# Patient Record
Sex: FEMALE | Race: WHITE | NOT HISPANIC OR LATINO | URBAN - METROPOLITAN AREA
[De-identification: names, ages, dates, MRNs, and addresses within clinical notes are randomized per-mention and may not be internally consistent; named-entity substitution may affect disease eponyms.]

---

## 2019-10-29 LAB — NONINV COLON CA DNA+OCC BLD SCRN STL QL: NEGATIVE

## 2020-02-28 LAB
BUN SERPL-MCNC: 12 MG/DL (ref 7–18)
CHOLEST SERPL-MCNC: 192 MG/DL
CREAT SERPL-MCNC: 0.84 MG/DL (ref 0.6–1.3)
GLUCOSE SERPL-MCNC: 99 MG/DL (ref 74–106)
HDLC SERPL-MCNC: 65 MG/DL (ref 35–60)
LDLC SERPL CALC-MCNC: 111 MG/DL
TRIGL SERPL-MCNC: 79 MG/DL (ref 30–150)

## 2022-04-10 ENCOUNTER — HISTORICAL (OUTPATIENT)
Dept: ADMINISTRATIVE | Facility: HOSPITAL | Age: 72
End: 2022-04-10

## 2022-04-26 VITALS
WEIGHT: 159.38 LBS | BODY MASS INDEX: 30.09 KG/M2 | OXYGEN SATURATION: 98 % | DIASTOLIC BLOOD PRESSURE: 80 MMHG | HEIGHT: 61 IN | SYSTOLIC BLOOD PRESSURE: 119 MMHG

## 2022-08-02 ENCOUNTER — OFFICE VISIT (OUTPATIENT)
Dept: PRIMARY CARE CLINIC | Facility: CLINIC | Age: 72
End: 2022-08-02
Payer: MEDICARE

## 2022-08-02 VITALS
DIASTOLIC BLOOD PRESSURE: 80 MMHG | OXYGEN SATURATION: 98 % | BODY MASS INDEX: 30.03 KG/M2 | SYSTOLIC BLOOD PRESSURE: 136 MMHG | WEIGHT: 157 LBS | HEART RATE: 81 BPM

## 2022-08-02 DIAGNOSIS — D64.9 ANEMIA, UNSPECIFIED TYPE: ICD-10-CM

## 2022-08-02 DIAGNOSIS — Z76.89 ENCOUNTER TO ESTABLISH CARE: Primary | ICD-10-CM

## 2022-08-02 DIAGNOSIS — E78.5 HYPERLIPIDEMIA, UNSPECIFIED HYPERLIPIDEMIA TYPE: ICD-10-CM

## 2022-08-02 DIAGNOSIS — Z00.00 WELLNESS EXAMINATION: ICD-10-CM

## 2022-08-02 DIAGNOSIS — M51.36 DDD (DEGENERATIVE DISC DISEASE), LUMBAR: ICD-10-CM

## 2022-08-02 PROBLEM — M51.369 DDD (DEGENERATIVE DISC DISEASE), LUMBAR: Status: ACTIVE | Noted: 2022-08-02

## 2022-08-02 PROCEDURE — 99214 PR OFFICE/OUTPT VISIT, EST, LEVL IV, 30-39 MIN: ICD-10-PCS | Mod: ,,, | Performed by: FAMILY MEDICINE

## 2022-08-02 PROCEDURE — 99214 OFFICE O/P EST MOD 30 MIN: CPT | Mod: ,,, | Performed by: FAMILY MEDICINE

## 2022-08-02 RX ORDER — ALCLOMETASONE DIPROPIONATE 0.5 MG/G
CREAM TOPICAL 2 TIMES DAILY
COMMUNITY
Start: 2022-07-31

## 2022-08-02 RX ORDER — ALBUTEROL SULFATE 90 UG/1
1 AEROSOL, METERED RESPIRATORY (INHALATION) EVERY 6 HOURS PRN
Qty: 6.7 G | Refills: 5 | Status: SHIPPED | OUTPATIENT
Start: 2022-08-02 | End: 2023-11-27 | Stop reason: SDUPTHER

## 2022-08-02 RX ORDER — BUDESONIDE 0.5 MG/2ML
INHALANT ORAL
COMMUNITY
Start: 2022-08-01 | End: 2023-11-27 | Stop reason: SDUPTHER

## 2022-08-02 RX ORDER — BUDESONIDE AND FORMOTEROL FUMARATE DIHYDRATE 80; 4.5 UG/1; UG/1
AEROSOL RESPIRATORY (INHALATION)
COMMUNITY
End: 2023-11-27 | Stop reason: SDUPTHER

## 2022-08-02 RX ORDER — ALBUTEROL SULFATE 90 UG/1
AEROSOL, METERED RESPIRATORY (INHALATION)
COMMUNITY
End: 2022-08-02 | Stop reason: SDUPTHER

## 2022-08-02 RX ORDER — MONTELUKAST SODIUM 10 MG/1
10 TABLET ORAL DAILY
COMMUNITY
Start: 2022-04-28 | End: 2022-08-02 | Stop reason: SDUPTHER

## 2022-08-02 RX ORDER — TRAMADOL HYDROCHLORIDE 50 MG/1
50 TABLET ORAL EVERY 12 HOURS PRN
Qty: 60 TABLET | Refills: 5 | Status: SHIPPED | OUTPATIENT
Start: 2022-08-02 | End: 2023-04-18

## 2022-08-02 RX ORDER — TRAMADOL HYDROCHLORIDE 50 MG/1
TABLET ORAL
COMMUNITY
End: 2022-08-02 | Stop reason: SDUPTHER

## 2022-08-02 RX ORDER — EPINEPHRINE 0.22MG
AEROSOL WITH ADAPTER (ML) INHALATION
COMMUNITY
Start: 2011-12-02

## 2022-08-02 RX ORDER — BETAMETHASONE VALERATE 1.2 MG/G
CREAM TOPICAL 2 TIMES DAILY
COMMUNITY
Start: 2022-05-03 | End: 2023-06-06

## 2022-08-02 RX ORDER — ALPRAZOLAM 1 MG/1
TABLET ORAL
COMMUNITY
End: 2023-08-24 | Stop reason: SDUPTHER

## 2022-08-02 RX ORDER — LEVOCETIRIZINE DIHYDROCHLORIDE 5 MG/1
TABLET, FILM COATED ORAL
COMMUNITY

## 2022-08-02 RX ORDER — BETAMETHASONE DIPROPIONATE 0.5 MG/G
LOTION TOPICAL
COMMUNITY
Start: 2022-07-15 | End: 2023-02-14

## 2022-08-02 RX ORDER — DOXYCYCLINE 50 MG/1
100 CAPSULE ORAL NIGHTLY
COMMUNITY
Start: 2022-07-15

## 2022-08-02 RX ORDER — MONTELUKAST SODIUM 10 MG/1
10 TABLET ORAL DAILY
Qty: 90 TABLET | Refills: 3 | Status: SHIPPED | OUTPATIENT
Start: 2022-08-02 | End: 2023-11-27 | Stop reason: SDUPTHER

## 2022-08-02 RX ORDER — VALSARTAN AND HYDROCHLOROTHIAZIDE 320; 25 MG/1; MG/1
TABLET, FILM COATED ORAL
COMMUNITY
End: 2022-12-05

## 2022-08-03 NOTE — ASSESSMENT & PLAN NOTE
Tramadol as needed for breakthrough pain with use of over-the-counter Tylenol and NSAID medication as needed for adequate pain control with referral in the future to  for pain management if necessary.  Difficulty walking less than 50 ft without having to stop secondary to pain, temporary handicap parking license recommended

## 2022-08-03 NOTE — PROGRESS NOTES
Chief Complaint  Chief Complaint   Patient presents with    Needs asthma medication       History of Present Illness  Patient presents to clinic for routine follow-up.  She is essentially establishing care in clinic again today because her last visit was nearly 2 years ago.  She states that she has continued to struggle with lumbar DDD with osteoarthritis currently controlled with as needed tramadol 1-2 times per day for breakthrough pain with inadequate control with over-the-counter NSAIDs and Tylenol.  Hypertension well controlled with current medication regimen as noted.  She states that she is having difficulty walking more than 50 ft without having pain in her back those on she does not ambulate extensively she feels like she can not control it.    Review of Systems  Review of Systems   Constitutional: Negative for chills and fever.   HENT: Negative for congestion and sore throat.    Eyes: Negative for redness and visual disturbance.   Respiratory: Negative for cough and shortness of breath.    Cardiovascular: Negative for chest pain and palpitations.   Gastrointestinal: Negative for nausea and vomiting.   Musculoskeletal: Positive for back pain. Negative for arthralgias and myalgias.   Skin: Negative for pallor and rash.   Neurological: Negative for dizziness and headaches.   Psychiatric/Behavioral: Negative for agitation and dysphoric mood.       Physical Exam  Physical Exam  Constitutional:       Appearance: Normal appearance.   HENT:      Head: Normocephalic and atraumatic.   Eyes:      General: No scleral icterus.     Conjunctiva/sclera: Conjunctivae normal.   Cardiovascular:      Rate and Rhythm: Normal rate and regular rhythm.   Pulmonary:      Effort: Pulmonary effort is normal.      Breath sounds: Normal breath sounds.   Skin:     General: Skin is warm and dry.   Neurological:      General: No focal deficit present.      Mental Status: She is alert and oriented to person, place, and time.    Psychiatric:         Mood and Affect: Mood normal.         Behavior: Behavior normal.         Problem List/Past Medical History  Past Medical History:   Diagnosis Date    HTN (hypertension)     Insomnia     Mild intermittent asthma, uncomplicated        Procedure/Surgical History  Past Surgical History:   Procedure Laterality Date    APPENDECTOMY      BUNIONECTOMY      CHOLECYSTECTOMY      SINUS SURGERY      TONSILLECTOMY         Medications  Current Outpatient Medications on File Prior to Visit   Medication Sig Dispense Refill    alclometasone (ACLOVATE) 0.05 % cream Apply topically 2 (two) times daily.      ALPRAZolam (XANAX) 1 MG tablet alprazolam 1 mg tablet   1/2 tab PRN insomnia      betamethasone dipropionate (DIPROLENE) 0.05 % lotion Apply topically as needed.      betamethasone valerate 0.1% (VALISONE) 0.1 % Crea Apply topically 2 (two) times daily.      budesonide (PULMICORT) 0.5 mg/2 mL nebulizer solution       budesonide-formoterol 80-4.5 mcg (SYMBICORT) 80-4.5 mcg/actuation HFAA budesonide-formoterol HFA 80 mcg-4.5 mcg/actuation aerosol inhaler   INHALE 2 PUFFS BY MOUTH TWICE A DAY      coenzyme Q10 100 mg capsule       doxycycline (MONODOX) 50 MG Cap Take 100 mg by mouth nightly.      levocetirizine (XYZAL) 5 MG tablet Xyzal 5 mg tablet   Take 1 tablet every day by oral route as needed.      valsartan-hydrochlorothiazide (DIOVAN-HCT) 320-25 mg per tablet valsartan 320 mg-hydrochlorothiazide 25 mg tablet   TAKE 1 TABLET BY MOUTH EVERY DAY       No current facility-administered medications on file prior to visit.       Allegies  Review of patient's allergies indicates:  No Known Allergies     Social History  Social History     Socioeconomic History    Marital status:    Tobacco Use    Smoking status: Never Smoker    Smokeless tobacco: Never Used       Family History  History reviewed. No pertinent family history.      Immunizations    There is no immunization history on file  for this patient.    Health Maintenance  Health Maintenance   Topic Date Due    Hepatitis C Screening  Never done    Lipid Panel  Never done    TETANUS VACCINE  Never done    Mammogram  Never done    DEXA Scan  Never done        Assessment / Plan  Problem List Items Addressed This Visit        Neuro    DDD (degenerative disc disease), lumbar    Current Assessment & Plan     Tramadol as needed for breakthrough pain with use of over-the-counter Tylenol and NSAID medication as needed for adequate pain control with referral in the future to  for pain management if necessary.  Difficulty walking less than 50 ft without having to stop secondary to pain, temporary handicap parking license recommended              Other    Encounter to establish care - Primary    Current Assessment & Plan     Discussed routine annual health maintenance and screening in addition to acute issues noted below.             Other Visit Diagnoses     Anemia, unspecified type        Wellness examination        Hyperlipidemia, unspecified hyperlipidemia type              RTC 6 months for wellness    Santos Templeton

## 2022-09-16 ENCOUNTER — HISTORICAL (OUTPATIENT)
Dept: ADMINISTRATIVE | Facility: HOSPITAL | Age: 72
End: 2022-09-16
Payer: COMMERCIAL

## 2023-02-09 ENCOUNTER — OFFICE VISIT (OUTPATIENT)
Dept: PRIMARY CARE CLINIC | Facility: CLINIC | Age: 73
End: 2023-02-09
Payer: MEDICARE

## 2023-02-09 VITALS
OXYGEN SATURATION: 96 % | BODY MASS INDEX: 29.45 KG/M2 | DIASTOLIC BLOOD PRESSURE: 74 MMHG | WEIGHT: 154 LBS | HEART RATE: 85 BPM | SYSTOLIC BLOOD PRESSURE: 129 MMHG

## 2023-02-09 DIAGNOSIS — G89.29 CHRONIC BACK PAIN, UNSPECIFIED BACK LOCATION, UNSPECIFIED BACK PAIN LATERALITY: Primary | ICD-10-CM

## 2023-02-09 DIAGNOSIS — M51.36 DDD (DEGENERATIVE DISC DISEASE), LUMBAR: ICD-10-CM

## 2023-02-09 DIAGNOSIS — M54.12 CERVICAL RADICULITIS: ICD-10-CM

## 2023-02-09 DIAGNOSIS — M54.9 CHRONIC BACK PAIN, UNSPECIFIED BACK LOCATION, UNSPECIFIED BACK PAIN LATERALITY: Primary | ICD-10-CM

## 2023-02-09 PROCEDURE — 99214 PR OFFICE/OUTPT VISIT, EST, LEVL IV, 30-39 MIN: ICD-10-PCS | Mod: ,,, | Performed by: FAMILY MEDICINE

## 2023-02-09 PROCEDURE — 99214 OFFICE O/P EST MOD 30 MIN: CPT | Mod: ,,, | Performed by: FAMILY MEDICINE

## 2023-02-09 RX ORDER — DOXYCYCLINE HYCLATE 50 MG/1
CAPSULE ORAL
COMMUNITY
Start: 2022-07-01

## 2023-02-09 NOTE — PROGRESS NOTES
Chief Complaint  Chief Complaint   Patient presents with    Back Pain     She will discuss it with you . Mike nerve, hurts when she walks and gets up from sitting position       History of Present Illness  Patient presents to clinic for routine follow-up.  Continues to struggle with back pain and is requesting referral to pain management specialist but not 1 that will work with medications, more with injections and also physical therapy in late chart was which is closer to where she lives.  She reports concern about possible vagus nerve issues which she looked up on the Internet after experiencing symptoms of at nighttime when turning while lying in bed having sensation radiating pain to the bilateral arms sometimes with a stress reaction but denies any syncope or lightheadedness or dizziness with activity.  A Work was performed earlier this morning but we have not yet received the results for review.    Review of Systems  Review of Systems   Constitutional:  Negative for chills and fever.   HENT:  Negative for congestion and sore throat.    Eyes:  Negative for redness and visual disturbance.   Respiratory:  Negative for cough and shortness of breath.    Cardiovascular:  Negative for chest pain and palpitations.   Gastrointestinal:  Negative for nausea and vomiting.   Musculoskeletal:  Positive for back pain. Negative for arthralgias and myalgias.   Skin:  Negative for pallor and rash.   Neurological:  Negative for dizziness and headaches.   Psychiatric/Behavioral:  Negative for agitation and dysphoric mood.      Physical Exam  Physical Exam  Constitutional:       Appearance: Normal appearance.   HENT:      Head: Normocephalic and atraumatic.   Eyes:      General: No scleral icterus.     Conjunctiva/sclera: Conjunctivae normal.   Cardiovascular:      Rate and Rhythm: Normal rate and regular rhythm.   Pulmonary:      Effort: Pulmonary effort is normal.      Breath sounds: Normal breath sounds.   Skin:      General: Skin is warm and dry.   Neurological:      General: No focal deficit present.      Mental Status: She is alert and oriented to person, place, and time.   Psychiatric:         Mood and Affect: Mood normal.         Behavior: Behavior normal.       Problem List/Past Medical History  Past Medical History:   Diagnosis Date    HTN (hypertension)     Insomnia     Mild intermittent asthma, uncomplicated        Procedure/Surgical History  Past Surgical History:   Procedure Laterality Date    APPENDECTOMY      BUNIONECTOMY      CHOLECYSTECTOMY      SINUS SURGERY      TONSILLECTOMY         Medications  Current Outpatient Medications on File Prior to Visit   Medication Sig Dispense Refill    albuterol (PROVENTIL/VENTOLIN HFA) 90 mcg/actuation inhaler Inhale 1 puff into the lungs every 6 (six) hours as needed for Wheezing. Rescue 6.7 g 5    alclometasone (ACLOVATE) 0.05 % cream Apply topically 2 (two) times daily.      ALPRAZolam (XANAX) 1 MG tablet alprazolam 1 mg tablet   1/2 tab PRN insomnia      betamethasone dipropionate (DIPROLENE) 0.05 % lotion Apply topically as needed.      betamethasone valerate 0.1% (VALISONE) 0.1 % Crea Apply topically 2 (two) times daily.      budesonide (PULMICORT) 0.5 mg/2 mL nebulizer solution       budesonide-formoterol 80-4.5 mcg (SYMBICORT) 80-4.5 mcg/actuation HFAA budesonide-formoterol HFA 80 mcg-4.5 mcg/actuation aerosol inhaler   INHALE 2 PUFFS BY MOUTH TWICE A DAY      coenzyme Q10 100 mg capsule       doxycycline (MONODOX) 50 MG Cap Take 100 mg by mouth nightly.      doxycycline 50 MG capsule       levocetirizine (XYZAL) 5 MG tablet Xyzal 5 mg tablet   Take 1 tablet every day by oral route as needed.      montelukast (SINGULAIR) 10 mg tablet Take 1 tablet (10 mg total) by mouth once daily. 90 tablet 3    traMADoL (ULTRAM) 50 mg tablet Take 1 tablet (50 mg total) by mouth every 12 (twelve) hours as needed for Pain. 60 tablet 5    valsartan-hydrochlorothiazide (DIOVAN-HCT) 320-25  mg per tablet TAKE 1 TABLET BY MOUTH EVERY DAY 90 tablet 3     No current facility-administered medications on file prior to visit.       Clem  Review of patient's allergies indicates:  No Known Allergies     Social History  Social History     Socioeconomic History    Marital status:    Tobacco Use    Smoking status: Never    Smokeless tobacco: Never   Substance and Sexual Activity    Alcohol use: Yes     Comment: 4 times a week 2 drinks    Drug use: Never    Sexual activity: Yes       Family History  History reviewed. No pertinent family history.      Immunizations    There is no immunization history on file for this patient.    Health Maintenance  Health Maintenance   Topic Date Due    Hepatitis C Screening  Never done    TETANUS VACCINE  Never done    Mammogram  Never done    DEXA Scan  Never done    Lipid Panel  02/28/2025        Assessment / Plan  Problem List Items Addressed This Visit          Neuro    DDD (degenerative disc disease), lumbar    Overview     Pain management and physical therapy         Cervical radiculitis    Overview     Discussed with patient that symptoms seem 1 lima cervical radiculopathy versus vagus reaction with possible stress reaction as well.  To rule out they go issues would recommend monitoring of vital signs with home blood pressure cuff when symptoms occur and report back in 1 week for virtual visit to discuss possible further treatment options.  ER precautions given for any signs of dyspnea on exertion or exertional chest pain.          Other Visit Diagnoses       Chronic back pain, unspecified back location, unspecified back pain laterality    -  Primary          RTC 1 week for telemed visit for lab review    Santos Templeton

## 2023-02-13 ENCOUNTER — OFFICE VISIT (OUTPATIENT)
Dept: PRIMARY CARE CLINIC | Facility: CLINIC | Age: 73
End: 2023-02-13
Payer: MEDICARE

## 2023-02-13 DIAGNOSIS — M51.36 DDD (DEGENERATIVE DISC DISEASE), LUMBAR: Primary | ICD-10-CM

## 2023-02-13 PROCEDURE — 99443 PR PHYSICIAN TELEPHONE EVALUATION 21-30 MIN: ICD-10-PCS | Mod: 95,,, | Performed by: FAMILY MEDICINE

## 2023-02-13 PROCEDURE — 99443 PR PHYSICIAN TELEPHONE EVALUATION 21-30 MIN: CPT | Mod: 95,,, | Performed by: FAMILY MEDICINE

## 2023-02-13 NOTE — PROGRESS NOTES
Established Patient - Audio Only Telehealth Visit     The patient location is: home  The chief complaint leading to consultation is: lab review  Visit type: Virtual visit with audio only (telephone)  Total time spent with patient: 23 mins       The reason for the audio only service rather than synchronous audio and video virtual visit was related to technical difficulties or patient preference/necessity.     Each patient to whom I provide medical services by telemedicine is:  (1) informed of the relationship between the physician and patient and the respective role of any other health care provider with respect to management of the patient; and (2) notified that they may decline to receive medical services by telemedicine and may withdraw from such care at any time. Patient verbally consented to receive this service via voice-only telephone call.       HPI:  Patient presents for follow-up after experiencing symptoms at time with radiating sensation to the bilateral arms when turning in bed.  She was concerned after research about the possibility of a vagal reaction but in clinic 4 days ago we discussed the possibility of a stress reaction and it attempted biofeedback techniques which seemed to improve the problem over the past several days.  She was also referred to pain management specialist for injections and physical therapy but has not yet heard from these referrals.  She denies any other acute complaints or concerns     Assessment and plan:  Continue biofeedback techniques and follow-up with referral to pain management and physical therapy                        This service was not originating from a related E/M service provided within the previous 7 days nor will  to an E/M service or procedure within the next 24 hours or my soonest available appointment.  Prevailing standard of care was able to be met in this audio-only visit.

## 2023-02-22 NOTE — PROGRESS NOTES
Subjective:      Patient ID: Mark Davila is a 72 y.o. female.    Chief Complaint: Back Pain (Referred by Dr. Benito Templeton for back pain 10 plus years, pt states no prior surgery or injury, has had injections in the past, had nerve burning a few years ago, would like to discuss options today pain level 7/10)    Referred by: Santos Templeton MD     HPI: Patient presents as a new consult for low back pain for 10+ years. Relates pain due to arthritis of lumbar spine.  Pain located to bilateral low back without radation to legs. Pain abated with prolonged standing., mopping, sweeping. Sitting to standing also increases pain. Pain  wakes her up at night. States her right hip will hurt on occasion. Patient has seen a prior Pain Management MD in [place] 3 years ago. She recalls a burning of her nerve to low back. This helped her back pain for >1.5 years. Currently back pain has returned. She can not recall the last MRI lumbar spine. Patient has never completed PT.   Describes pain as burning and aching.    Pain meds include Tramadol 50 mg BID, Ibuprofen 600 mg two days a week.     Interventional Pain History  Prior injections to lumbar spine from pain specialist in [place]  (possible diagnostic medial branch blocks and radiofrequency ablation)    ROS: bilateral low back pain      MRI lumbar spine:  No imaging over past 3+ years completed    Pertinent labs 2022:   Creatinine normal 0.84   No current GFR, comprehensive metabolic panel, or CBC with differential on file in EMR, care everywhere or             Objective:          Physical Exam  General: Well developed;LLoquacious, overweight; A&O x 3; No anxiety/depression; NAD  Mental Status: Oriented to person, palce and time. Displays appropriate mood & affect.  Head: Norm cephalic and atraumatic  Neck: full ROm with lateral neck turning, extension and flexion.  Eyes: normal conjunctiva, normal lids, normal pupils  ENT and mouth: normal external ear,  nose, and no lesions noted on the lips.  Respiratory: Symmetrical, Unlabored. No dyspnea  CV: normal  S1/S2, normal rhythm and rate. No peripheral edema.   Abdomen: Non-distended    Extremities:  Gen: No cyanosis or tenderness to palpation bilateral upper and lower extremities  Skin: Warm, pink, dry, no rashes, no lesions on the lumbar spine  Strength: 5/5 motor strength bilateral upper and lower  ROM: Full ROM in bilateral knees and ankles without pain or instability.    Neuro:  Gait: no altalgic lean, normal toe and heel raise. Independent ambulation   DTR's: 2+ in bilateral patellar, and ankle  Sensory: Intact to light touch bilateral  upper and lower extremities    Spine: Normal lordosis. No scoliosis  L-spine ROM: Full ROM to flexion,  pain and limited ROM extension with bilateral rotation   Straight Leg Raise: negative bilaterally  SI Joint: No tenderness to palpation bilaterally.             Assessment:       Encounter Diagnoses   Name Primary?    Chronic back pain greater than 3 months duration Yes    Chronic back pain, unspecified back location, unspecified back pain laterality          Plan:       Mark was seen today for back pain.    Diagnoses and all orders for this visit:    Chronic back pain greater than 3 months duration    Chronic back pain, unspecified back location, unspecified back pain laterality  -     Ambulatory referral/consult to Pain Clinic     Patient to call back  or send message in portal with PT group in [place], [place] to eval and treat lumbar spondylosis.     Pt will eventually need a MRI lumbar spine to investigate sources of her low back pain.  Her pain is likely due to lumbar facet arthropathy.    Patient does not want sedation (propofol) for MBB if indicated and would like for me to ask Dr. Colin if she can forego this in the future. She would also like to receive RFA's to bilateral low back at the same time without sedation She states she had this done in the past and it  was manageable.  Request sent to Dr. Colin to confirm if she would perform these procedures without sedation, provided these procedures are indicated after patient was able to complete a lumbar MRI.  I will follow-up with patient to relay Dr. Colin's response.    Request current labs CBC w/diff, CMP from Dr. Templeton PCP    After PT completed 6 weeks, patient will need a follow up appointment with me in clinic.           Past Medical History:   Diagnosis Date    HTN (hypertension)     Insomnia     Mild intermittent asthma, uncomplicated        Past Surgical History:   Procedure Laterality Date    APPENDECTOMY      BUNIONECTOMY      CHOLECYSTECTOMY      SINUS SURGERY      TONSILLECTOMY         History reviewed. No pertinent family history.    Social History     Socioeconomic History    Marital status:    Tobacco Use    Smoking status: Never    Smokeless tobacco: Never   Substance and Sexual Activity    Alcohol use: Yes     Comment: 4 times a week 2 drinks    Drug use: Never    Sexual activity: Yes       Current Outpatient Medications   Medication Sig Dispense Refill    albuterol (PROVENTIL/VENTOLIN HFA) 90 mcg/actuation inhaler Inhale 1 puff into the lungs every 6 (six) hours as needed for Wheezing. Rescue 6.7 g 5    alclometasone (ACLOVATE) 0.05 % cream Apply topically 2 (two) times daily.      ALPRAZolam (XANAX) 1 MG tablet alprazolam 1 mg tablet   1/2 tab PRN insomnia      betamethasone valerate 0.1% (VALISONE) 0.1 % Crea Apply topically 2 (two) times daily.      budesonide (PULMICORT) 0.5 mg/2 mL nebulizer solution       budesonide-formoterol 80-4.5 mcg (SYMBICORT) 80-4.5 mcg/actuation HFAA budesonide-formoterol HFA 80 mcg-4.5 mcg/actuation aerosol inhaler   INHALE 2 PUFFS BY MOUTH TWICE A DAY      coenzyme Q10 100 mg capsule       doxycycline (MONODOX) 50 MG Cap Take 100 mg by mouth nightly.      doxycycline 50 MG capsule       levocetirizine (XYZAL) 5 MG tablet Xyzal 5 mg tablet   Take 1 tablet  every day by oral route as needed.      montelukast (SINGULAIR) 10 mg tablet Take 1 tablet (10 mg total) by mouth once daily. 90 tablet 3    traMADoL (ULTRAM) 50 mg tablet Take 1 tablet (50 mg total) by mouth every 12 (twelve) hours as needed for Pain. 60 tablet 5    valsartan-hydrochlorothiazide (DIOVAN-HCT) 320-25 mg per tablet TAKE 1 TABLET BY MOUTH EVERY DAY 90 tablet 3     No current facility-administered medications for this visit.       Review of patient's allergies indicates:  No Known Allergies

## 2023-02-23 ENCOUNTER — OFFICE VISIT (OUTPATIENT)
Dept: PAIN MEDICINE | Facility: CLINIC | Age: 73
End: 2023-02-23
Payer: MEDICARE

## 2023-02-23 VITALS
HEART RATE: 65 BPM | DIASTOLIC BLOOD PRESSURE: 76 MMHG | SYSTOLIC BLOOD PRESSURE: 126 MMHG | HEIGHT: 61 IN | WEIGHT: 154 LBS | BODY MASS INDEX: 29.07 KG/M2 | TEMPERATURE: 99 F

## 2023-02-23 DIAGNOSIS — M54.9 CHRONIC BACK PAIN, UNSPECIFIED BACK LOCATION, UNSPECIFIED BACK PAIN LATERALITY: ICD-10-CM

## 2023-02-23 DIAGNOSIS — M47.816 LUMBAR SPONDYLOSIS: Primary | ICD-10-CM

## 2023-02-23 DIAGNOSIS — M54.9 CHRONIC BACK PAIN GREATER THAN 3 MONTHS DURATION: ICD-10-CM

## 2023-02-23 DIAGNOSIS — G89.29 CHRONIC BACK PAIN, UNSPECIFIED BACK LOCATION, UNSPECIFIED BACK PAIN LATERALITY: ICD-10-CM

## 2023-02-23 DIAGNOSIS — G89.29 CHRONIC BACK PAIN GREATER THAN 3 MONTHS DURATION: ICD-10-CM

## 2023-02-23 PROCEDURE — 99204 PR OFFICE/OUTPT VISIT, NEW, LEVL IV, 45-59 MIN: ICD-10-PCS | Mod: ,,, | Performed by: NURSE PRACTITIONER

## 2023-02-23 PROCEDURE — 99204 OFFICE O/P NEW MOD 45 MIN: CPT | Mod: ,,, | Performed by: NURSE PRACTITIONER

## 2023-02-23 NOTE — PATIENT INSTRUCTIONS
Call us or send message through portal to let me know where to place PT order in [place].     I will ask Dr Colin if she will  if she will forego anesthesia/sedation if she is a candidate for lumbar Medial Branch Blocks and subsequent Radiofrequency Ablation in the future.      I am also asking Dr Templeton to send us your current complete metabolic panel and complete blood cell count as well.

## 2023-04-03 ENCOUNTER — PATIENT MESSAGE (OUTPATIENT)
Dept: ADMINISTRATIVE | Facility: HOSPITAL | Age: 73
End: 2023-04-03
Payer: COMMERCIAL

## 2023-05-01 ENCOUNTER — PATIENT MESSAGE (OUTPATIENT)
Dept: ADMINISTRATIVE | Facility: HOSPITAL | Age: 73
End: 2023-05-01
Payer: COMMERCIAL

## 2023-05-30 ENCOUNTER — PATIENT MESSAGE (OUTPATIENT)
Dept: ADMINISTRATIVE | Facility: HOSPITAL | Age: 73
End: 2023-05-30
Payer: COMMERCIAL

## 2023-06-06 ENCOUNTER — OFFICE VISIT (OUTPATIENT)
Dept: PRIMARY CARE CLINIC | Facility: CLINIC | Age: 73
End: 2023-06-06
Payer: MEDICARE

## 2023-06-06 VITALS
BODY MASS INDEX: 29.84 KG/M2 | SYSTOLIC BLOOD PRESSURE: 144 MMHG | DIASTOLIC BLOOD PRESSURE: 88 MMHG | TEMPERATURE: 99 F | HEART RATE: 64 BPM | WEIGHT: 156 LBS | OXYGEN SATURATION: 95 %

## 2023-06-06 DIAGNOSIS — M25.561 ACUTE PAIN OF RIGHT KNEE: Primary | ICD-10-CM

## 2023-06-06 DIAGNOSIS — K12.30 MUCOSITIS: Primary | ICD-10-CM

## 2023-06-06 PROBLEM — J45.20 MILD INTERMITTENT ASTHMA, UNCOMPLICATED: Status: ACTIVE | Noted: 2023-06-06

## 2023-06-06 PROBLEM — Z76.89 ENCOUNTER TO ESTABLISH CARE: Status: RESOLVED | Noted: 2022-08-02 | Resolved: 2023-06-06

## 2023-06-06 PROBLEM — R22.41 LOCALIZED SWELLING OF RIGHT LOWER EXTREMITY: Status: ACTIVE | Noted: 2023-06-06

## 2023-06-06 PROCEDURE — 99214 PR OFFICE/OUTPT VISIT, EST, LEVL IV, 30-39 MIN: ICD-10-PCS | Mod: ,,, | Performed by: STUDENT IN AN ORGANIZED HEALTH CARE EDUCATION/TRAINING PROGRAM

## 2023-06-06 PROCEDURE — 99214 OFFICE O/P EST MOD 30 MIN: CPT | Mod: ,,, | Performed by: STUDENT IN AN ORGANIZED HEALTH CARE EDUCATION/TRAINING PROGRAM

## 2023-06-06 RX ORDER — PREDNISOLONE 15 MG/5ML
SOLUTION ORAL
COMMUNITY
End: 2023-06-06 | Stop reason: SDUPTHER

## 2023-06-06 RX ORDER — PREDNISOLONE 15 MG/5ML
SOLUTION ORAL
Qty: 480 ML | Refills: 1 | Status: SHIPPED | OUTPATIENT
Start: 2023-06-06 | End: 2023-08-24 | Stop reason: SDUPTHER

## 2023-06-06 NOTE — PROGRESS NOTES
Date: 06/06/2023  Patient ID: [id number]   Chief Complaint: Leg Swelling (Swelling from the ankle to the thigh on the right leg.)    HPI:   Mark Davila is a 72 y.o. female here today for Leg Swelling (Swelling from the ankle to the thigh on the right leg.)  4 mos ago patient was grabbing something from cabinet and felt sharp-stabbing pain on lateral aspect of anterior right knee. It resolved but returned 1 month ago with diffuse pain and swelling on right knee, with crepitus, improved with raising legs and in morning and mildly with Ibuprofen and Tramadol (used for back pain), worsens with pressure/moving. Wears whole leg brace. She denies weakness, locking.   Past Medical History:   Diagnosis Date    HTN (hypertension)     Insomnia     Mild intermittent asthma, uncomplicated     Phlebitis     right leg       Past Surgical History:   Procedure Laterality Date    APPENDECTOMY      BUNIONECTOMY      CHOLECYSTECTOMY      SINUS SURGERY      TONSILLECTOMY         Review of patient's allergies indicates:  No Known Allergies    Outpatient Medications Marked as Taking for the 6/6/23 encounter (Office Visit) with Shy Valentin MD   Medication Sig Dispense Refill    albuterol (PROVENTIL/VENTOLIN HFA) 90 mcg/actuation inhaler Inhale 1 puff into the lungs every 6 (six) hours as needed for Wheezing. Rescue 6.7 g 5    alclometasone (ACLOVATE) 0.05 % cream Apply topically 2 (two) times daily.      ALPRAZolam (XANAX) 1 MG tablet alprazolam 1 mg tablet   1/2 tab PRN insomnia      budesonide (PULMICORT) 0.5 mg/2 mL nebulizer solution       budesonide-formoterol 80-4.5 mcg (SYMBICORT) 80-4.5 mcg/actuation HFAA budesonide-formoterol HFA 80 mcg-4.5 mcg/actuation aerosol inhaler   INHALE 2 PUFFS BY MOUTH TWICE A DAY      coenzyme Q10 100 mg capsule       doxycycline (MONODOX) 50 MG Cap Take 100 mg by mouth nightly.      doxycycline 50 MG capsule       levocetirizine (XYZAL) 5 MG tablet Xyzal 5 mg tablet   Take 1 tablet every day  by oral route as needed.      montelukast (SINGULAIR) 10 mg tablet Take 1 tablet (10 mg total) by mouth once daily. 90 tablet 3    traMADoL (ULTRAM) 50 mg tablet TAKE 1 TABLET BY MOUTH EVERY 12 HOURS AS NEEDED FOR PAIN. 14 tablet 0    valsartan-hydrochlorothiazide (DIOVAN-HCT) 320-25 mg per tablet TAKE 1 TABLET BY MOUTH EVERY DAY 90 tablet 3       History reviewed. No pertinent family history.     Social History     Socioeconomic History    Marital status:    Tobacco Use    Smoking status: Never    Smokeless tobacco: Never   Substance and Sexual Activity    Alcohol use: Yes     Comment: 4 times a week 2 drinks    Drug use: Never    Sexual activity: Yes       Patient Care Team:  Shy Valentin MD as PCP - General (Family Medicine)     Subjective:     Review of Systems   Respiratory:  Negative for shortness of breath.    Cardiovascular:  Negative for chest pain.   See HPI for details  All Other ROS: Negative except as stated in HPI.     Objective:     BP (!) 144/88   Pulse 64   Temp 99 °F (37.2 °C)   Wt 70.8 kg (156 lb)   SpO2 95%   BMI 29.84 kg/m²     Physical Exam  HENT:      Head: Normocephalic.      Right Ear: External ear normal.      Left Ear: External ear normal.   Pulmonary:      Effort: Pulmonary effort is normal.   Musculoskeletal:      Comments: Musculoskeletal: Right KNEE     Inspection: Antalgic gait; Full weight bearing; 2+ swelling; No erythema; No contusion; No atrophy or deconditioning noted; Normal alignment     Palpation: TTP medial aspect; Mild crepitus; Normal temperature     ROM:        Active Flexion/Extension: (0-140) Full and painless     Strength:        Flexion:  5/5        Extension:  5/5     Special Tests:        Ballotable Effusion: Negative        Lachman's: ACL stable        Posterior Drawer: PCL stable        Patellar Grind (patellofemoral syndrome): Negative        Neurovascular: Intact, 2+ distal pulses, Sensation intact to light touch     Lymphatic: No lymphadenopathy            Musculoskeletal: Left KNEE     Inspection: Normal gait; Full weight bearing; No swelling; No erythema; No contusion; No atrophy or deconditioning noted; Normal alignment     Palpation: Non-tender; Crepitus Negative; Normal temperature     ROM:        Active Flexion/Extension: (0-140) Full and painless     Strength:        Flexion:  5/5        Extension:  5/5     Special Tests:         Ballotable Effusion: Negative        Lachman's: ACL stable        Posterior Drawer: PCL stable        Patellar Grind (patellofemoral syndrome): Negative        Neurovascular: Intact, 2+ distal pulses, Sensation intact to light touch     Neurovascular: Intact, 2+ distal pulses, Sensation intact to light touch     Lymphatic: No lymphadenopathy     Neurological:      Mental Status: She is alert.       Assessment:       ICD-10-CM ICD-9-CM   1. Acute pain of right knee  M25.561 719.46        Plan:     1. Acute pain of right knee  Overview:  Patient with initial conservative management at this time. Discussed treatment options with patient:  -Avoid provocative activities  -Diclofenac Gel applied to affected area bid prn  -Rest, NSAIDs (OTC Tylenol/Ibuprofen), Local heat or ice to the area  -Home exercise program  -Supportive brace  -Can consider: Acupuncture, Physical Therapy  -Weight loss  -Activity as tolerated  -XR BL knees  Resolution typically within 3 weeks in 91% of patients, and a 4% recurrence rate after 2 years  If symptoms worsen, consider referral to ortho      Orders:  -     X-Ray Knee Complete 4 Or More Views Bilat; Future; Expected date: 06/06/2023         Medication List with Changes/Refills   Current Medications    ALBUTEROL (PROVENTIL/VENTOLIN HFA) 90 MCG/ACTUATION INHALER    Inhale 1 puff into the lungs every 6 (six) hours as needed for Wheezing. Rescue       Start Date: 8/2/2022  End Date: --    ALCLOMETASONE (ACLOVATE) 0.05 % CREAM    Apply topically 2 (two) times daily.       Start Date: 7/31/2022 End Date: --     ALPRAZOLAM (XANAX) 1 MG TABLET    alprazolam 1 mg tablet   1/2 tab PRN insomnia       Start Date: --        End Date: --    BUDESONIDE (PULMICORT) 0.5 MG/2 ML NEBULIZER SOLUTION           Start Date: 8/1/2022  End Date: --    BUDESONIDE-FORMOTEROL 80-4.5 MCG (SYMBICORT) 80-4.5 MCG/ACTUATION HFAA    budesonide-formoterol HFA 80 mcg-4.5 mcg/actuation aerosol inhaler   INHALE 2 PUFFS BY MOUTH TWICE A DAY       Start Date: --        End Date: --    COENZYME Q10 100 MG CAPSULE           Start Date: 12/2/2011 End Date: --    DOXYCYCLINE (MONODOX) 50 MG CAP    Take 100 mg by mouth nightly.       Start Date: 7/15/2022 End Date: --    DOXYCYCLINE 50 MG CAPSULE           Start Date: 7/1/2022  End Date: --    LEVOCETIRIZINE (XYZAL) 5 MG TABLET    Xyzal 5 mg tablet   Take 1 tablet every day by oral route as needed.       Start Date: --        End Date: --    MONTELUKAST (SINGULAIR) 10 MG TABLET    Take 1 tablet (10 mg total) by mouth once daily.       Start Date: 8/2/2022  End Date: --    TRAMADOL (ULTRAM) 50 MG TABLET    TAKE 1 TABLET BY MOUTH EVERY 12 HOURS AS NEEDED FOR PAIN.       Start Date: 4/18/2023 End Date: --    VALSARTAN-HYDROCHLOROTHIAZIDE (DIOVAN-HCT) 320-25 MG PER TABLET    TAKE 1 TABLET BY MOUTH EVERY DAY       Start Date: 12/5/2022 End Date: --   Changed and/or Refilled Medications    Modified Medication Previous Medication    PREDNISOLONE (PRELONE) 15 MG/5 ML SYRUP prednisoLONE (PRELONE) 15 mg/5 mL syrup       TAKE 5 10 ML BY MOUTH 3 TIMES A DAY AS NEEDED FOR MUCOCITIS/DYSPNEA    TAKE 5 10 ML BY MOUTH 3 TIMES A DAY AS NEEDED FOR MUCOCITIS/DYSPNEA       Start Date: 6/6/2023  End Date: --    Start Date: --        End Date: 6/6/2023   Discontinued Medications    BETAMETHASONE VALERATE 0.1% (VALISONE) 0.1 % CREA    Apply topically 2 (two) times daily.       Start Date: 5/3/2022  End Date: 6/6/2023          Follow up in about 4 weeks (around 7/4/2023). In addition to their scheduled follow up, the patient has  also been instructed to follow up on as needed basis.

## 2023-06-07 ENCOUNTER — DOCUMENTATION ONLY (OUTPATIENT)
Dept: PRIMARY CARE CLINIC | Facility: CLINIC | Age: 73
End: 2023-06-07
Payer: COMMERCIAL

## 2023-06-21 ENCOUNTER — TELEPHONE (OUTPATIENT)
Dept: PRIMARY CARE CLINIC | Facility: CLINIC | Age: 73
End: 2023-06-21
Payer: COMMERCIAL

## 2023-06-22 DIAGNOSIS — M17.11 OSTEOARTHRITIS OF RIGHT KNEE, UNSPECIFIED OSTEOARTHRITIS TYPE: Primary | ICD-10-CM

## 2023-06-26 ENCOUNTER — PATIENT OUTREACH (OUTPATIENT)
Dept: ADMINISTRATIVE | Facility: HOSPITAL | Age: 73
End: 2023-06-26
Payer: COMMERCIAL

## 2023-07-25 ENCOUNTER — PATIENT MESSAGE (OUTPATIENT)
Dept: ADMINISTRATIVE | Facility: HOSPITAL | Age: 73
End: 2023-07-25
Payer: COMMERCIAL

## 2023-08-24 ENCOUNTER — OFFICE VISIT (OUTPATIENT)
Dept: PRIMARY CARE CLINIC | Facility: CLINIC | Age: 73
End: 2023-08-24
Payer: MEDICARE

## 2023-08-24 VITALS
OXYGEN SATURATION: 96 % | SYSTOLIC BLOOD PRESSURE: 119 MMHG | WEIGHT: 150.38 LBS | DIASTOLIC BLOOD PRESSURE: 74 MMHG | HEIGHT: 62 IN | BODY MASS INDEX: 27.67 KG/M2 | HEART RATE: 78 BPM | TEMPERATURE: 100 F

## 2023-08-24 DIAGNOSIS — F41.9 ANXIETY: ICD-10-CM

## 2023-08-24 DIAGNOSIS — M19.90 OSTEOARTHRITIS, UNSPECIFIED OSTEOARTHRITIS TYPE, UNSPECIFIED SITE: Primary | ICD-10-CM

## 2023-08-24 DIAGNOSIS — K12.30 MUCOSITIS: ICD-10-CM

## 2023-08-24 DIAGNOSIS — M51.36 DDD (DEGENERATIVE DISC DISEASE), LUMBAR: ICD-10-CM

## 2023-08-24 PROCEDURE — 99214 OFFICE O/P EST MOD 30 MIN: CPT | Mod: ,,, | Performed by: STUDENT IN AN ORGANIZED HEALTH CARE EDUCATION/TRAINING PROGRAM

## 2023-08-24 PROCEDURE — 99214 PR OFFICE/OUTPT VISIT, EST, LEVL IV, 30-39 MIN: ICD-10-PCS | Mod: ,,, | Performed by: STUDENT IN AN ORGANIZED HEALTH CARE EDUCATION/TRAINING PROGRAM

## 2023-08-24 RX ORDER — TRAMADOL HYDROCHLORIDE 50 MG/1
50 TABLET ORAL EVERY 6 HOURS PRN
Qty: 28 TABLET | Refills: 0 | Status: SHIPPED | OUTPATIENT
Start: 2023-08-24 | End: 2023-11-27 | Stop reason: SDUPTHER

## 2023-08-24 RX ORDER — ALPRAZOLAM 1 MG/1
TABLET ORAL
Qty: 30 TABLET | Refills: 3 | Status: SHIPPED | OUTPATIENT
Start: 2023-08-24

## 2023-08-24 RX ORDER — PREDNISOLONE 15 MG/5ML
SOLUTION ORAL
Qty: 480 ML | Refills: 1 | Status: SHIPPED | OUTPATIENT
Start: 2023-08-24

## 2023-08-24 NOTE — PROGRESS NOTES
Date: 08/24/2023  Patient ID: [id number]   Chief Complaint: Medication Refill (Also needs handicap form filled out)    HPI:   Mark Davila is a 73 y.o. female here today for Medication Refill (Also needs handicap form filled out)  Patient recently had f/u with orthopedics who gave her steroid injection and aspiration with significant improvement. Patient moved and ice chest with her R leg, which restarted her knee pain. She also has chronic lumbar DJD. Needs refills on Tramadol and other medications. She needs handicap form.    Patient Active Problem List   Diagnosis    DDD (degenerative disc disease), lumbar    Cervical radiculitis    Localized swelling of right lower extremity    Degenerative joint disease    Mild intermittent asthma, uncomplicated    Anxiety     Outpatient Medications Marked as Taking for the 8/24/23 encounter (Office Visit) with Shy Valentin MD   Medication Sig Dispense Refill    albuterol (PROVENTIL/VENTOLIN HFA) 90 mcg/actuation inhaler Inhale 1 puff into the lungs every 6 (six) hours as needed for Wheezing. Rescue 6.7 g 5    alclometasone (ACLOVATE) 0.05 % cream Apply topically 2 (two) times daily.      budesonide (PULMICORT) 0.5 mg/2 mL nebulizer solution       budesonide-formoterol 80-4.5 mcg (SYMBICORT) 80-4.5 mcg/actuation HFAA budesonide-formoterol HFA 80 mcg-4.5 mcg/actuation aerosol inhaler   INHALE 2 PUFFS BY MOUTH TWICE A DAY      coenzyme Q10 100 mg capsule       doxycycline (MONODOX) 50 MG Cap Take 100 mg by mouth nightly.      levocetirizine (XYZAL) 5 MG tablet Xyzal 5 mg tablet   Take 1 tablet every day by oral route as needed.      montelukast (SINGULAIR) 10 mg tablet Take 1 tablet (10 mg total) by mouth once daily. 90 tablet 3    valsartan-hydrochlorothiazide (DIOVAN-HCT) 320-25 mg per tablet TAKE 1 TABLET BY MOUTH EVERY DAY 90 tablet 3    [DISCONTINUED] ALPRAZolam (XANAX) 1 MG tablet alprazolam 1 mg tablet   1/2 tab PRN insomnia      [DISCONTINUED] prednisoLONE  "(PRELONE) 15 mg/5 mL syrup TAKE 5 10 ML BY MOUTH 3 TIMES A DAY AS NEEDED FOR MUCOCITIS/DYSPNEA 480 mL 1    [DISCONTINUED] traMADoL (ULTRAM) 50 mg tablet TAKE 1 TABLET BY MOUTH EVERY 12 HOURS AS NEEDED FOR PAIN. 14 tablet 0     Past Medical History:   Diagnosis Date    Degenerative joint disease 06/06/2023    HTN (hypertension)     Insomnia     Mild intermittent asthma, uncomplicated     Mild intermittent asthma, uncomplicated     Phlebitis     right leg     Past Surgical History:   Procedure Laterality Date    APPENDECTOMY      BUNIONECTOMY      CHOLECYSTECTOMY      SINUS SURGERY      x 3 with implant    TONSILLECTOMY       Review of patient's allergies indicates:  No Known Allergies  History reviewed. No pertinent family history.   Social History     Socioeconomic History    Marital status:    Tobacco Use    Smoking status: Never    Smokeless tobacco: Never   Substance and Sexual Activity    Alcohol use: Yes     Comment: 4 times a week 2 drinks    Drug use: Never    Sexual activity: Yes     Patient Care Team:  Shy Valentin MD as PCP - General (Family Medicine)  Bernadette Peñaloza LPN as Care Coordinator   Subjective:   ROS  See HPI for details  All Other ROS: Negative except as stated in HPI.   Objective:   /74   Pulse 78   Temp 100 °F (37.8 °C)   Ht 5' 2" (1.575 m)   Wt 68.2 kg (150 lb 6.4 oz)   SpO2 96%   BMI 27.51 kg/m²   Physical Exam  General: NAD  Eye: EOMI  HENT: no nasal discharge  Respiratory: non-labored breathing  Musculoskeletal: ambulates independently but slowly. No obvious deformity  Integumentary: no apparent discoloration  Neurologic: Alert, oriented to person and situation  Cognition and Speech: Speech clear and coherent.   Psychiatric: Cooperative    Assessment:       ICD-10-CM ICD-9-CM   1. Osteoarthritis, unspecified osteoarthritis type, unspecified site  M19.90 715.90   2. Mucositis  K12.30 528.00   3. DDD (degenerative disc disease), lumbar  M51.36 722.52   4. Anxiety  " F41.9 300.00      Plan:   1. Osteoarthritis, unspecified osteoarthritis type, unspecified site  Overview:  Refilled Tramadol for breakthrough pain  Continue HEP and OTC medication  F/u with orthopedics   Consider PT in future      2. Mucositis  -     prednisoLONE (PRELONE) 15 mg/5 mL syrup; TAKE 5 10 ML BY MOUTH 3 TIMES A DAY AS NEEDED FOR MUCOCITIS/DYSPNEA  Dispense: 480 mL; Refill: 1    3. DDD (degenerative disc disease), lumbar  Overview:  As above  Consider PT  Handicap form filled and given to patient    Orders:  -     traMADoL (ULTRAM) 50 mg tablet; Take 1 tablet (50 mg total) by mouth every 6 (six) hours as needed for Pain.  Dispense: 28 tablet; Refill: 0    4. Anxiety  Overview:  Continue current treatment  Practice deep breathing or abdominal breathing exercises when anxiety occurs.  Exercise daily. Get sunlight daily.  Avoid caffeine, alcohol and stimulants.  Practice positive phrases and repeat throughout the day, along with yoga and relaxation techniques.  Set healthy boundaries, avoid people and conversations that increase stress.  Educated patient on the risks of serotonin based medications such as serotonin modulators and SSRIs/SNRIs including common side effects of nausea, GI upset, headache dizziness as well as the rare risk for worsening symptoms of depression including development of suicidal thoughts or ideations, and serotonin syndrome.   Discussed benefits of medication not becoming noticeable until up to 6 weeks from start date.   Report any symptoms of suicidal or homicidal ideations immediately, if clinic is closed go to nearest emergency room.        Orders:  -     ALPRAZolam (XANAX) 1 MG tablet; Take 1/2 tablet qhs prn for insomnia  Dispense: 30 tablet; Refill: 3         Medication List with Changes/Refills   Current Medications    ALBUTEROL (PROVENTIL/VENTOLIN HFA) 90 MCG/ACTUATION INHALER    Inhale 1 puff into the lungs every 6 (six) hours as needed for Wheezing. Rescue       Start Date:  8/2/2022  End Date: --    ALCLOMETASONE (ACLOVATE) 0.05 % CREAM    Apply topically 2 (two) times daily.       Start Date: 7/31/2022 End Date: --    BUDESONIDE (PULMICORT) 0.5 MG/2 ML NEBULIZER SOLUTION           Start Date: 8/1/2022  End Date: --    BUDESONIDE-FORMOTEROL 80-4.5 MCG (SYMBICORT) 80-4.5 MCG/ACTUATION HFAA    budesonide-formoterol HFA 80 mcg-4.5 mcg/actuation aerosol inhaler   INHALE 2 PUFFS BY MOUTH TWICE A DAY       Start Date: --        End Date: --    COENZYME Q10 100 MG CAPSULE           Start Date: 12/2/2011 End Date: --    DOXYCYCLINE (MONODOX) 50 MG CAP    Take 100 mg by mouth nightly.       Start Date: 7/15/2022 End Date: --    DOXYCYCLINE 50 MG CAPSULE           Start Date: 7/1/2022  End Date: --    LEVOCETIRIZINE (XYZAL) 5 MG TABLET    Xyzal 5 mg tablet   Take 1 tablet every day by oral route as needed.       Start Date: --        End Date: --    MONTELUKAST (SINGULAIR) 10 MG TABLET    Take 1 tablet (10 mg total) by mouth once daily.       Start Date: 8/2/2022  End Date: --    VALSARTAN-HYDROCHLOROTHIAZIDE (DIOVAN-HCT) 320-25 MG PER TABLET    TAKE 1 TABLET BY MOUTH EVERY DAY       Start Date: 12/5/2022 End Date: --   Changed and/or Refilled Medications    Modified Medication Previous Medication    ALPRAZOLAM (XANAX) 1 MG TABLET ALPRAZolam (XANAX) 1 MG tablet       Take 1/2 tablet qhs prn for insomnia    alprazolam 1 mg tablet   1/2 tab PRN insomnia       Start Date: 8/24/2023 End Date: --    Start Date: --        End Date: 8/24/2023    PREDNISOLONE (PRELONE) 15 MG/5 ML SYRUP prednisoLONE (PRELONE) 15 mg/5 mL syrup       TAKE 5 10 ML BY MOUTH 3 TIMES A DAY AS NEEDED FOR MUCOCITIS/DYSPNEA    TAKE 5 10 ML BY MOUTH 3 TIMES A DAY AS NEEDED FOR MUCOCITIS/DYSPNEA       Start Date: 8/24/2023 End Date: --    Start Date: 6/6/2023  End Date: 8/24/2023    TRAMADOL (ULTRAM) 50 MG TABLET traMADoL (ULTRAM) 50 mg tablet       Take 1 tablet (50 mg total) by mouth every 6 (six) hours as needed for Pain.     TAKE 1 TABLET BY MOUTH EVERY 12 HOURS AS NEEDED FOR PAIN.       Start Date: 8/24/2023 End Date: --    Start Date: 4/18/2023 End Date: 8/24/2023          Follow up in about 3 months (around 11/24/2023), or if symptoms worsen or fail to improve, for Pain mgmt. In addition to their scheduled follow up, the patient has also been instructed to follow up on as needed basis.

## 2023-08-24 NOTE — LETTER
I certify that (Name) Mark Davila meets the requirements as outlined in # 6 (shown on reverse side) and qualifies for a mobility impaired license plate/hang-tag. I further understand that willful and false certification shall subject me to fines/imprisonment as outlined in R.S. 47:463.4 (G) (4). The applicant's information is as follows:    YOB: 1950            Race:White        Gender:Female    Address:  [address] [place] [address]    City:[place]                               State:[place]     Zip Code:[address]     [x]Permanently Impaired - Applicant has a total or lifelong condition of mobility impairment from which little or no improvement or recovery can reasonably be expected. A medical examiners certification is required on initial application only.      [x] Temporarily Impaired - Applicant has a temporary condition of mobility impairment of which improvement or recovery can reasonably be expected. Applicant is entitled to a hangtag, which will be valid for one (1) year. A medical examiners certification is required for the renewal of the hangtag      [] Unable to appear in person at the Office of Motor Vehicles - Applicant must bring facial photo        Medical Examiner's Signature________________________________________ Date:8/24/23_________________________    Printed Name:_Dougcarly Homero___________________________________________    State License #__[id number]________________________    [address]  [address]                                     Phone Number: [phone]                                                                                                                                                                                                                  City: [place]__________________________________ State: [place] __Zip Code: [address] _______________    TO BE COMPLETED BY MOTOR VEHICLE ANALYST ONLY    [place]   Lic. Plate #      Hangtag Control #   Hangtag  ID #      Date Issued:    #:   Office #:

## 2023-09-19 ENCOUNTER — PATIENT MESSAGE (OUTPATIENT)
Dept: ADMINISTRATIVE | Facility: HOSPITAL | Age: 73
End: 2023-09-19
Payer: COMMERCIAL

## 2023-11-14 NOTE — PROGRESS NOTES
Date: 11/27/2023  Patient ID: [id number]   Chief Complaint: Asthma (Has nebulizer needs meds, needs singulair )    HPI:   Mark Davila is a 73 y.o. female here today for Asthma (Has nebulizer needs meds, needs singulair )  2wks ago patient was exposed to son with RSV. Recently developed cough and congestion, which has not been worsening. Needs refills on Albuterol and Singulair since ran out 10 days ago.      Past Medical History:   Diagnosis Date    Degenerative joint disease 06/06/2023    HTN (hypertension)     Insomnia     Mild intermittent asthma, uncomplicated     Mild intermittent asthma, uncomplicated     Phlebitis     right leg     Outpatient Medications Marked as Taking for the 11/27/23 encounter (Office Visit) with Shy Valentin MD   Medication Sig Dispense Refill    alclometasone (ACLOVATE) 0.05 % cream Apply topically 2 (two) times daily.      ALPRAZolam (XANAX) 1 MG tablet Take 1/2 tablet qhs prn for insomnia 30 tablet 3    coenzyme Q10 100 mg capsule       doxycycline (MONODOX) 50 MG Cap Take 100 mg by mouth nightly.      levocetirizine (XYZAL) 5 MG tablet Xyzal 5 mg tablet   Take 1 tablet every day by oral route as needed.      prednisoLONE (PRELONE) 15 mg/5 mL syrup TAKE 5 10 ML BY MOUTH 3 TIMES A DAY AS NEEDED FOR MUCOCITIS/DYSPNEA 480 mL 1    [DISCONTINUED] albuterol (PROVENTIL/VENTOLIN HFA) 90 mcg/actuation inhaler Inhale 1 puff into the lungs every 6 (six) hours as needed for Wheezing. Rescue 6.7 g 5    [DISCONTINUED] budesonide (PULMICORT) 0.5 mg/2 mL nebulizer solution       [DISCONTINUED] budesonide-formoterol 80-4.5 mcg (SYMBICORT) 80-4.5 mcg/actuation HFAA budesonide-formoterol HFA 80 mcg-4.5 mcg/actuation aerosol inhaler   INHALE 2 PUFFS BY MOUTH TWICE A DAY      [DISCONTINUED] montelukast (SINGULAIR) 10 mg tablet Take 1 tablet (10 mg total) by mouth once daily. 90 tablet 3    [DISCONTINUED] traMADoL (ULTRAM) 50 mg tablet Take 1 tablet (50 mg total) by mouth every 6 (six) hours as  "needed for Pain. 28 tablet 0    [DISCONTINUED] valsartan-hydrochlorothiazide (DIOVAN-HCT) 320-25 mg per tablet TAKE 1 TABLET BY MOUTH EVERY DAY 90 tablet 3     Past Surgical History:   Procedure Laterality Date    APPENDECTOMY      BUNIONECTOMY      CHOLECYSTECTOMY      SINUS SURGERY      x 3 with implant    TONSILLECTOMY       Review of patient's allergies indicates:  No Known Allergies  History reviewed. No pertinent family history.   Social History     Socioeconomic History    Marital status:    Tobacco Use    Smoking status: Never    Smokeless tobacco: Never   Substance and Sexual Activity    Alcohol use: Yes     Comment: 4 times a week 2 drinks    Drug use: Never    Sexual activity: Yes     Patient Care Team:  Shy Valentin MD as PCP - General (Family Medicine)  Bernadette Peñaloza LPN as Care Coordinator   Subjective:   ROS  See HPI for details  All Other ROS: Negative except as stated in HPI.   Objective:   /67   Pulse 80   Ht 5' 2" (1.575 m)   Wt 67 kg (147 lb 11.2 oz)   SpO2 98%   BMI 27.01 kg/m²   Physical Exam  General: NAD  Eye: EOMI  HENT: no nasal discharge  CV: RRR  Respiratory: non-labored breathing  Musculoskeletal: ambulates independently. No obvious deformity  Integumentary: no apparent discoloration  Neurologic: Alert, oriented to person and situation  Cognition and Speech: Speech clear and coherent.   Psychiatric: Cooperative  Assessment:       ICD-10-CM ICD-9-CM   1. DDD (degenerative disc disease), lumbar  M51.36 722.52   2. Primary hypertension  I10 401.9   3. Asthma, unspecified asthma severity, unspecified whether complicated, unspecified whether persistent  J45.909 493.90      Plan:   1. DDD (degenerative disc disease), lumbar  Assessment & Plan:  Controlled substance agreement signed previous visit  /narx care checked without discrepancies noted  1 month medication refills given for breathrough pain  Quarterly visits required for continued prescriptions with " minimum annual wellness exam.  Discussed adverse effects, including but not limited to constipation, addiction, respiratory depression, death      Orders:  -     Drug Screen, Urine; Future; Expected date: 11/27/2023  -     traMADoL (ULTRAM) 50 mg tablet; Take 1 tablet (50 mg total) by mouth every 6 (six) hours as needed for Pain.  Dispense: 28 tablet; Refill: 0    2. Primary hypertension  Assessment & Plan:  Continue current medication regimen  Blood pressure at goal <140/90 (<130/80 if otherwise noted)  Recommend DASH diet  Avoid tobacco use  Record BP at home daily and bring log to next office visit, assure that home cuff is calibrated at minimum every 12 months      Orders:  -     valsartan-hydrochlorothiazide (DIOVAN-HCT) 320-25 mg per tablet; Take 1 tablet by mouth once daily.  Dispense: 90 tablet; Refill: 3    3. Asthma, unspecified asthma severity, unspecified whether complicated, unspecified whether persistent  Assessment & Plan:  Stable  Continue current treatment    Orders:  -     Drug Screen, Urine; Future; Expected date: 11/27/2023  -     albuterol (PROVENTIL/VENTOLIN HFA) 90 mcg/actuation inhaler; Inhale 1 puff into the lungs every 6 (six) hours as needed for Wheezing. Rescue  Dispense: 6.7 g; Refill: 11  -     budesonide (PULMICORT) 0.5 mg/2 mL nebulizer solution; Take 2 mLs (0.5 mg total) by nebulization 4 (four) times daily as needed (shortness of breath).  Dispense: 2 mL; Refill: 11  -     budesonide-formoterol 80-4.5 mcg (SYMBICORT) 80-4.5 mcg/actuation HFAA; budesonide-formoterol HFA 80 mcg-4.5 mcg/actuation aerosol inhaler   INHALE 2 PUFFS BY MOUTH TWICE A DAY  Dispense: 10.2 g; Refill: 11  -     montelukast (SINGULAIR) 10 mg tablet; Take 1 tablet (10 mg total) by mouth once daily.  Dispense: 90 tablet; Refill: 3         Medication List with Changes/Refills   Current Medications    ALCLOMETASONE (ACLOVATE) 0.05 % CREAM    Apply topically 2 (two) times daily.       Start Date: 7/31/2022 End Date:  --    ALPRAZOLAM (XANAX) 1 MG TABLET    Take 1/2 tablet qhs prn for insomnia       Start Date: 8/24/2023 End Date: --    COENZYME Q10 100 MG CAPSULE           Start Date: 12/2/2011 End Date: --    DOXYCYCLINE (MONODOX) 50 MG CAP    Take 100 mg by mouth nightly.       Start Date: 7/15/2022 End Date: --    DOXYCYCLINE 50 MG CAPSULE           Start Date: 7/1/2022  End Date: --    LEVOCETIRIZINE (XYZAL) 5 MG TABLET    Xyzal 5 mg tablet   Take 1 tablet every day by oral route as needed.       Start Date: --        End Date: --    PREDNISOLONE (PRELONE) 15 MG/5 ML SYRUP    TAKE 5 10 ML BY MOUTH 3 TIMES A DAY AS NEEDED FOR MUCOCITIS/DYSPNEA       Start Date: 8/24/2023 End Date: --   Changed and/or Refilled Medications    Modified Medication Previous Medication    ALBUTEROL (PROVENTIL/VENTOLIN HFA) 90 MCG/ACTUATION INHALER albuterol (PROVENTIL/VENTOLIN HFA) 90 mcg/actuation inhaler       Inhale 1 puff into the lungs every 6 (six) hours as needed for Wheezing. Rescue    Inhale 1 puff into the lungs every 6 (six) hours as needed for Wheezing. Rescue       Start Date: 11/27/2023End Date: --    Start Date: 8/2/2022  End Date: 11/27/2023    BUDESONIDE (PULMICORT) 0.5 MG/2 ML NEBULIZER SOLUTION budesonide (PULMICORT) 0.5 mg/2 mL nebulizer solution       Take 2 mLs (0.5 mg total) by nebulization 4 (four) times daily as needed (shortness of breath).           Start Date: 11/27/2023End Date: --    Start Date: 8/1/2022  End Date: 11/27/2023    BUDESONIDE-FORMOTEROL 80-4.5 MCG (SYMBICORT) 80-4.5 MCG/ACTUATION HFAA budesonide-formoterol 80-4.5 mcg (SYMBICORT) 80-4.5 mcg/actuation HFAA       budesonide-formoterol HFA 80 mcg-4.5 mcg/actuation aerosol inhaler   INHALE 2 PUFFS BY MOUTH TWICE A DAY    budesonide-formoterol HFA 80 mcg-4.5 mcg/actuation aerosol inhaler   INHALE 2 PUFFS BY MOUTH TWICE A DAY       Start Date: 11/27/2023End Date: --    Start Date: --        End Date: 11/27/2023    MONTELUKAST (SINGULAIR) 10 MG TABLET montelukast  (SINGULAIR) 10 mg tablet       Take 1 tablet (10 mg total) by mouth once daily.    Take 1 tablet (10 mg total) by mouth once daily.       Start Date: 11/27/2023End Date: --    Start Date: 8/2/2022  End Date: 11/27/2023    TRAMADOL (ULTRAM) 50 MG TABLET traMADoL (ULTRAM) 50 mg tablet       Take 1 tablet (50 mg total) by mouth every 6 (six) hours as needed for Pain.    Take 1 tablet (50 mg total) by mouth every 6 (six) hours as needed for Pain.       Start Date: 11/27/2023End Date: --    Start Date: 8/24/2023 End Date: 11/27/2023    VALSARTAN-HYDROCHLOROTHIAZIDE (DIOVAN-HCT) 320-25 MG PER TABLET valsartan-hydrochlorothiazide (DIOVAN-HCT) 320-25 mg per tablet       Take 1 tablet by mouth once daily.    TAKE 1 TABLET BY MOUTH EVERY DAY       Start Date: 11/27/2023End Date: --    Start Date: 12/5/2022 End Date: 11/27/2023        Follow up in about 3 months (around 2/27/2024) for med refill, Pain mgmt, Needs contract/UDS. In addition to their scheduled follow up, the patient has also been instructed to follow up on as needed basis.

## 2023-11-27 ENCOUNTER — OFFICE VISIT (OUTPATIENT)
Dept: PRIMARY CARE CLINIC | Facility: CLINIC | Age: 73
End: 2023-11-27
Payer: MEDICARE

## 2023-11-27 VITALS
OXYGEN SATURATION: 98 % | SYSTOLIC BLOOD PRESSURE: 139 MMHG | BODY MASS INDEX: 27.18 KG/M2 | DIASTOLIC BLOOD PRESSURE: 67 MMHG | HEART RATE: 80 BPM | WEIGHT: 147.69 LBS | HEIGHT: 62 IN

## 2023-11-27 DIAGNOSIS — Z79.899 LONG-TERM CURRENT USE OF HIGH RISK MEDICATION OTHER THAN ANTICOAGULANT: ICD-10-CM

## 2023-11-27 DIAGNOSIS — R79.9 ABNORMAL FINDING OF BLOOD CHEMISTRY, UNSPECIFIED: ICD-10-CM

## 2023-11-27 DIAGNOSIS — J45.909 ASTHMA, UNSPECIFIED ASTHMA SEVERITY, UNSPECIFIED WHETHER COMPLICATED, UNSPECIFIED WHETHER PERSISTENT: ICD-10-CM

## 2023-11-27 DIAGNOSIS — M51.36 DDD (DEGENERATIVE DISC DISEASE), LUMBAR: Primary | ICD-10-CM

## 2023-11-27 DIAGNOSIS — Z00.00 WELLNESS EXAMINATION: ICD-10-CM

## 2023-11-27 DIAGNOSIS — I10 PRIMARY HYPERTENSION: ICD-10-CM

## 2023-11-27 PROCEDURE — 99214 PR OFFICE/OUTPT VISIT, EST, LEVL IV, 30-39 MIN: ICD-10-PCS | Mod: ,,, | Performed by: STUDENT IN AN ORGANIZED HEALTH CARE EDUCATION/TRAINING PROGRAM

## 2023-11-27 PROCEDURE — 99214 OFFICE O/P EST MOD 30 MIN: CPT | Mod: ,,, | Performed by: STUDENT IN AN ORGANIZED HEALTH CARE EDUCATION/TRAINING PROGRAM

## 2023-11-27 RX ORDER — ALBUTEROL SULFATE 90 UG/1
1 AEROSOL, METERED RESPIRATORY (INHALATION) EVERY 6 HOURS PRN
Qty: 6.7 G | Refills: 11 | Status: SHIPPED | OUTPATIENT
Start: 2023-11-27

## 2023-11-27 RX ORDER — VALSARTAN AND HYDROCHLOROTHIAZIDE 320; 25 MG/1; MG/1
1 TABLET, FILM COATED ORAL DAILY
Qty: 90 TABLET | Refills: 3 | Status: SHIPPED | OUTPATIENT
Start: 2023-11-27

## 2023-11-27 RX ORDER — TRAMADOL HYDROCHLORIDE 50 MG/1
50 TABLET ORAL EVERY 6 HOURS PRN
Qty: 28 TABLET | Refills: 0 | Status: SHIPPED | OUTPATIENT
Start: 2023-11-27 | End: 2024-02-28 | Stop reason: SDUPTHER

## 2023-11-27 RX ORDER — BUDESONIDE AND FORMOTEROL FUMARATE DIHYDRATE 80; 4.5 UG/1; UG/1
AEROSOL RESPIRATORY (INHALATION)
Qty: 10.2 G | Refills: 11 | Status: SHIPPED | OUTPATIENT
Start: 2023-11-27 | End: 2024-02-28 | Stop reason: SDUPTHER

## 2023-11-27 RX ORDER — BUDESONIDE 0.5 MG/2ML
0.5 INHALANT ORAL 4 TIMES DAILY PRN
Qty: 2 ML | Refills: 11 | Status: SHIPPED | OUTPATIENT
Start: 2023-11-27

## 2023-11-27 RX ORDER — MONTELUKAST SODIUM 10 MG/1
10 TABLET ORAL DAILY
Qty: 90 TABLET | Refills: 3 | Status: SHIPPED | OUTPATIENT
Start: 2023-11-27

## 2023-11-27 NOTE — ASSESSMENT & PLAN NOTE
Controlled substance agreement signed previous visit  /narx care checked without discrepancies noted  1 month medication refills given for breathrough pain  Quarterly visits required for continued prescriptions with minimum annual wellness exam.  Discussed adverse effects, including but not limited to constipation, addiction, respiratory depression, death

## 2023-11-27 NOTE — ASSESSMENT & PLAN NOTE
Continue current medication regimen  Blood pressure at goal <140/90 (<130/80 if otherwise noted)  Recommend DASH diet  Avoid tobacco use  Record BP at home daily and bring log to next office visit, assure that home cuff is calibrated at minimum every 12 months

## 2024-02-21 ENCOUNTER — TELEPHONE (OUTPATIENT)
Dept: PRIMARY CARE CLINIC | Facility: CLINIC | Age: 74
End: 2024-02-21
Payer: COMMERCIAL

## 2024-02-21 PROBLEM — M19.90 DEGENERATIVE JOINT DISEASE: Status: RESOLVED | Noted: 2023-06-06 | Resolved: 2024-02-21

## 2024-02-21 NOTE — PROGRESS NOTES
Date: 02/28/2024  Patient ID: [id number]   Chief Complaint: Follow-up (With labs, refill on inhaler)    HPI:   Mark Davila is a 73 y.o. female here today for Follow-up (With labs, refill on inhaler)  Last visit patient was given tramadol for breakthrough pain of DDD (alison of R knee) and refilled asthma medications. Has been going to chiropractor for Stem Wave Therapy (has 4/8sessions) and pain is improved so she doesn't need Tramadol as often. Would like refill in case she needs in future. Needs refill on Symbicort. Otherwise asthma stable on it and not need Proventil. Had [facility] from [facility] Friday.   3wks ago had diverticulosis and took home Flagyl and sx improved  Patient Active Problem List   Diagnosis    Screening for diabetes mellitus    DDD (degenerative disc disease), lumbar    Cervical radiculitis    Localized swelling of right lower extremity    Mild intermittent asthma, uncomplicated    Anxiety    HTN (hypertension)    Asthma    Diverticulosis     Outpatient Medications Marked as Taking for the 2/28/24 encounter (Office Visit) with Shy Valentin MD   Medication Sig Dispense Refill    albuterol (PROVENTIL/VENTOLIN HFA) 90 mcg/actuation inhaler Inhale 1 puff into the lungs every 6 (six) hours as needed for Wheezing. Rescue 6.7 g 11    alclometasone (ACLOVATE) 0.05 % cream Apply topically 2 (two) times daily.      ALPRAZolam (XANAX) 1 MG tablet Take 1/2 tablet qhs prn for insomnia 30 tablet 3    budesonide (PULMICORT) 0.5 mg/2 mL nebulizer solution Take 2 mLs (0.5 mg total) by nebulization 4 (four) times daily as needed (shortness of breath). 2 mL 11    coenzyme Q10 100 mg capsule       doxycycline (MONODOX) 50 MG Cap Take 100 mg by mouth nightly.      levocetirizine (XYZAL) 5 MG tablet Xyzal 5 mg tablet   Take 1 tablet every day by oral route as needed.      montelukast (SINGULAIR) 10 mg tablet Take 1 tablet (10 mg total) by mouth once daily. 90 tablet 3    valsartan-hydrochlorothiazide  "(DIOVAN-HCT) 320-25 mg per tablet Take 1 tablet by mouth once daily. 90 tablet 3    [DISCONTINUED] budesonide-formoterol 80-4.5 mcg (SYMBICORT) 80-4.5 mcg/actuation HFAA budesonide-formoterol HFA 80 mcg-4.5 mcg/actuation aerosol inhaler   INHALE 2 PUFFS BY MOUTH TWICE A DAY 10.2 g 11    [DISCONTINUED] traMADoL (ULTRAM) 50 mg tablet Take 1 tablet (50 mg total) by mouth every 6 (six) hours as needed for Pain. 28 tablet 0     Past Medical History:   Diagnosis Date    Degenerative joint disease 06/06/2023    HTN (hypertension)     Insomnia     Mild intermittent asthma, uncomplicated     Mild intermittent asthma, uncomplicated     Phlebitis     right leg     Past Surgical History:   Procedure Laterality Date    APPENDECTOMY      BUNIONECTOMY      CHOLECYSTECTOMY      SINUS SURGERY      x 3 with implant    TONSILLECTOMY       Review of patient's allergies indicates:  No Known Allergies  History reviewed. No pertinent family history.   Social History     Socioeconomic History    Marital status:    Tobacco Use    Smoking status: Never    Smokeless tobacco: Never   Substance and Sexual Activity    Alcohol use: Yes     Comment: 4 times a week 2 drinks    Drug use: Never    Sexual activity: Yes     Patient Care Team:  Shy Valentin MD as PCP - General (Family Medicine)  Bernadette Peñaloza LPN as Care Coordinator   Subjective:   ROS  See HPI for details  All Other ROS: Negative except as stated in HPI.   Objective:   /74   Pulse (!) 59   Temp 98.8 °F (37.1 °C)   Ht 5' 2" (1.575 m)   Wt 67.6 kg (149 lb)   SpO2 97%   BMI 27.25 kg/m²   Physical Exam  General: NAD  Eye: EOMI  HENT: no nasal discharge  Respiratory: non-labored breathing  Musculoskeletal: ambulates independently. No obvious deformity  Integumentary: no apparent discoloration  Neurologic: Alert, oriented to person and situation  Cognition and Speech: Speech clear and coherent.   Psychiatric: Cooperative    Assessment:       ICD-10-CM ICD-9-CM "   1. DDD (degenerative disc disease), lumbar  M51.36 722.52   2. Asthma, unspecified asthma severity, unspecified whether complicated, unspecified whether persistent  J45.909 493.90   3. Diverticulosis  K57.90 562.10   4. Primary hypertension  I10 401.9   5. Screening for diabetes mellitus  Z13.1 V77.1      Plan:   1. DDD (degenerative disc disease), lumbar  Assessment & Plan:  Continue Stem Wave tx  Tramadol refilled for breakthrough pain    Orders:  -     traMADoL (ULTRAM) 50 mg tablet; Take 1 tablet (50 mg total) by mouth every 6 (six) hours as needed for Pain.  Dispense: 28 tablet; Refill: 0  -     CBC Auto Differential; Future; Expected date: 08/28/2024  -     Vitamin D; Future; Expected date: 08/28/2024    2. Asthma, unspecified asthma severity, unspecified whether complicated, unspecified whether persistent  Assessment & Plan:  Stable  Continue current treatment with Symbicort and Albuterol prn      Orders:  -     budesonide-formoterol 80-4.5 mcg (SYMBICORT) 80-4.5 mcg/actuation HFAA; Inhale 2 puffs into the lungs 2 (two) times a day.  Dispense: 10.2 g; Refill: 11    3. Diverticulosis  Assessment & Plan:  Avoid triggering foods, low fiber, avoid opioids as possible  Avoid tobacco use  If inflammation occurs, start antibiotic therapy :  Flagyl 500 mg q.8 hour plus ciprofloxacin 500 mg b.i.d. for 7-10 days  ED precautions for worsening symptoms      Orders:  -     metroNIDAZOLE (FLAGYL) 500 MG tablet; Take 1 tablet (500 mg total) by mouth 3 (three) times daily. for 7 days  Dispense: 21 tablet; Refill: 0  -     ciprofloxacin HCl (CIPRO) 500 MG tablet; Take 1 tablet (500 mg total) by mouth every 12 (twelve) hours. for 7 days  Dispense: 14 tablet; Refill: 0  -     CBC Auto Differential; Future; Expected date: 08/28/2024  -     Comprehensive Metabolic Panel; Future; Expected date: 08/28/2024    4. Primary hypertension  -     Comprehensive Metabolic Panel; Future; Expected date: 08/28/2024  -     Lipid Panel;  Future; Expected date: 08/28/2024  -     TSH; Future; Expected date: 08/28/2024  -     Hemoglobin A1C; Future; Expected date: 08/28/2024  -     Urinalysis; Future; Expected date: 08/28/2024  -     T4, Free; Future; Expected date: 08/28/2024    5. Screening for diabetes mellitus  -     Hemoglobin A1C; Future; Expected date: 08/28/2024         Medication List with Changes/Refills   New Medications    CIPROFLOXACIN HCL (CIPRO) 500 MG TABLET    Take 1 tablet (500 mg total) by mouth every 12 (twelve) hours. for 7 days       Start Date: 2/28/2024 End Date: 3/6/2024    METRONIDAZOLE (FLAGYL) 500 MG TABLET    Take 1 tablet (500 mg total) by mouth 3 (three) times daily. for 7 days       Start Date: 2/28/2024 End Date: 3/6/2024   Current Medications    ALBUTEROL (PROVENTIL/VENTOLIN HFA) 90 MCG/ACTUATION INHALER    Inhale 1 puff into the lungs every 6 (six) hours as needed for Wheezing. Rescue       Start Date: 11/27/2023End Date: --    ALCLOMETASONE (ACLOVATE) 0.05 % CREAM    Apply topically 2 (two) times daily.       Start Date: 7/31/2022 End Date: --    ALPRAZOLAM (XANAX) 1 MG TABLET    Take 1/2 tablet qhs prn for insomnia       Start Date: 8/24/2023 End Date: --    BUDESONIDE (PULMICORT) 0.5 MG/2 ML NEBULIZER SOLUTION    Take 2 mLs (0.5 mg total) by nebulization 4 (four) times daily as needed (shortness of breath).       Start Date: 11/27/2023End Date: --    COENZYME Q10 100 MG CAPSULE           Start Date: 12/2/2011 End Date: --    DOXYCYCLINE (MONODOX) 50 MG CAP    Take 100 mg by mouth nightly.       Start Date: 7/15/2022 End Date: --    DOXYCYCLINE 50 MG CAPSULE           Start Date: 7/1/2022  End Date: --    LEVOCETIRIZINE (XYZAL) 5 MG TABLET    Xyzal 5 mg tablet   Take 1 tablet every day by oral route as needed.       Start Date: --        End Date: --    MONTELUKAST (SINGULAIR) 10 MG TABLET    Take 1 tablet (10 mg total) by mouth once daily.       Start Date: 11/27/2023End Date: --    PREDNISOLONE (PRELONE) 15 MG/5  ML SYRUP    TAKE 5 10 ML BY MOUTH 3 TIMES A DAY AS NEEDED FOR MUCOCITIS/DYSPNEA       Start Date: 8/24/2023 End Date: --    VALSARTAN-HYDROCHLOROTHIAZIDE (DIOVAN-HCT) 320-25 MG PER TABLET    Take 1 tablet by mouth once daily.       Start Date: 11/27/2023End Date: --   Changed and/or Refilled Medications    Modified Medication Previous Medication    BUDESONIDE-FORMOTEROL 80-4.5 MCG (SYMBICORT) 80-4.5 MCG/ACTUATION HFAA budesonide-formoterol 80-4.5 mcg (SYMBICORT) 80-4.5 mcg/actuation HFAA       Inhale 2 puffs into the lungs 2 (two) times a day.    budesonide-formoterol HFA 80 mcg-4.5 mcg/actuation aerosol inhaler   INHALE 2 PUFFS BY MOUTH TWICE A DAY       Start Date: 2/28/2024 End Date: --    Start Date: 11/27/2023End Date: 2/28/2024    TRAMADOL (ULTRAM) 50 MG TABLET traMADoL (ULTRAM) 50 mg tablet       Take 1 tablet (50 mg total) by mouth every 6 (six) hours as needed for Pain.    Take 1 tablet (50 mg total) by mouth every 6 (six) hours as needed for Pain.       Start Date: 2/28/2024 End Date: --    Start Date: 11/27/2023End Date: 2/28/2024        Follow up in about 6 months (around 8/28/2024) for Medicare Wellness. In addition to their scheduled follow up, the patient has also been instructed to follow up on as needed basis.

## 2024-02-21 NOTE — TELEPHONE ENCOUNTER
Lab orders faxed to [facility] [phone]    ----- Message from Niharika Duque sent at 2/21/2024  3:12 PM CST -----  Regarding: lab  Caller is requesting to schedule their Lab appointment prior to annual appointment.  Order is not listed in EPIC.  Please enter order and contact patient to schedule.    Name of Caller:pt    Preferred Date and Time of Labs:fri 2/23    Date of EPP Appointment:2/28    Where would they like the lab performed? [facility]     Would the patient rather a call back or a response via [facility]? C/b    Best Call Back Number:[phone]    Additional Information: please fax orders to fax # [phone]

## 2024-02-28 ENCOUNTER — OFFICE VISIT (OUTPATIENT)
Dept: PRIMARY CARE CLINIC | Facility: CLINIC | Age: 74
End: 2024-02-28
Payer: MEDICARE

## 2024-02-28 VITALS
DIASTOLIC BLOOD PRESSURE: 74 MMHG | HEART RATE: 59 BPM | SYSTOLIC BLOOD PRESSURE: 126 MMHG | HEIGHT: 62 IN | TEMPERATURE: 99 F | WEIGHT: 149 LBS | BODY MASS INDEX: 27.42 KG/M2 | OXYGEN SATURATION: 97 %

## 2024-02-28 DIAGNOSIS — K57.90 DIVERTICULOSIS: ICD-10-CM

## 2024-02-28 DIAGNOSIS — J45.909 ASTHMA, UNSPECIFIED ASTHMA SEVERITY, UNSPECIFIED WHETHER COMPLICATED, UNSPECIFIED WHETHER PERSISTENT: ICD-10-CM

## 2024-02-28 DIAGNOSIS — M51.36 DDD (DEGENERATIVE DISC DISEASE), LUMBAR: Primary | ICD-10-CM

## 2024-02-28 DIAGNOSIS — I10 PRIMARY HYPERTENSION: ICD-10-CM

## 2024-02-28 DIAGNOSIS — Z13.1 SCREENING FOR DIABETES MELLITUS: ICD-10-CM

## 2024-02-28 PROCEDURE — 99214 OFFICE O/P EST MOD 30 MIN: CPT | Mod: ,,, | Performed by: STUDENT IN AN ORGANIZED HEALTH CARE EDUCATION/TRAINING PROGRAM

## 2024-02-28 RX ORDER — BUDESONIDE AND FORMOTEROL FUMARATE DIHYDRATE 80; 4.5 UG/1; UG/1
2 AEROSOL RESPIRATORY (INHALATION) 2 TIMES DAILY
Qty: 10.2 G | Refills: 11 | Status: SHIPPED | OUTPATIENT
Start: 2024-02-28

## 2024-02-28 RX ORDER — CIPROFLOXACIN 500 MG/1
500 TABLET ORAL EVERY 12 HOURS
Qty: 14 TABLET | Refills: 0 | Status: SHIPPED | OUTPATIENT
Start: 2024-02-28 | End: 2024-03-06

## 2024-02-28 RX ORDER — METRONIDAZOLE 500 MG/1
500 TABLET ORAL 3 TIMES DAILY
Qty: 21 TABLET | Refills: 0 | Status: SHIPPED | OUTPATIENT
Start: 2024-02-28 | End: 2024-03-06

## 2024-02-28 RX ORDER — TRAMADOL HYDROCHLORIDE 50 MG/1
50 TABLET ORAL EVERY 6 HOURS PRN
Qty: 28 TABLET | Refills: 0 | Status: SHIPPED | OUTPATIENT
Start: 2024-02-28

## 2024-02-28 NOTE — ASSESSMENT & PLAN NOTE
Avoid triggering foods, low fiber, avoid opioids as possible  Avoid tobacco use  If inflammation occurs, start antibiotic therapy :  Flagyl 500 mg q.8 hour plus ciprofloxacin 500 mg b.i.d. for 7-10 days  ED precautions for worsening symptoms

## 2024-02-29 ENCOUNTER — DOCUMENTATION ONLY (OUTPATIENT)
Dept: PRIMARY CARE CLINIC | Facility: CLINIC | Age: 74
End: 2024-02-29
Payer: COMMERCIAL

## 2024-03-04 DIAGNOSIS — E87.6 HYPOKALEMIA: Primary | ICD-10-CM

## 2024-03-04 RX ORDER — POTASSIUM CHLORIDE 20 MEQ/1
20 TABLET, EXTENDED RELEASE ORAL 2 TIMES DAILY
Qty: 6 TABLET | Refills: 0 | Status: SHIPPED | OUTPATIENT
Start: 2024-03-04 | End: 2024-03-07

## 2024-06-03 PROBLEM — Z13.1 SCREENING FOR DIABETES MELLITUS: Status: RESOLVED | Noted: 2022-08-02 | Resolved: 2024-06-03

## 2024-06-13 ENCOUNTER — PATIENT OUTREACH (OUTPATIENT)
Facility: CLINIC | Age: 74
End: 2024-06-13
Payer: COMMERCIAL

## 2024-06-13 NOTE — PROGRESS NOTES
Health Maintenance Topic(s) Outreach Outcomes & Actions Taken:    Breast Cancer Screening - Outreach Outcomes & Actions Taken  : LeftMessage    Colorectal Cancer Screening - Outreach Outcomes & Actions Taken  : Left Message          Additional Notes:  Left message concerning mammogram and colon cancer screening.

## 2024-06-26 ENCOUNTER — TELEPHONE (OUTPATIENT)
Dept: PRIMARY CARE CLINIC | Facility: CLINIC | Age: 74
End: 2024-06-26
Payer: COMMERCIAL

## 2024-07-25 ENCOUNTER — PATIENT OUTREACH (OUTPATIENT)
Facility: CLINIC | Age: 74
End: 2024-07-25
Payer: COMMERCIAL

## 2024-07-25 NOTE — PROGRESS NOTES
Health Maintenance Topic(s) Outreach Outcomes & Actions Taken:  Chart review, called to patient left message/Portal message. To address the following.    Breast Cancer Screening - Outreach Outcomes & Actions Taken  : Offer mammogram overdue    Osteoporosis Screening - Outreach Outcomes & Actions Taken  : Offer Dexa with Mammogram    Colorectal Cancer Screening - Outreach Outcomes & Actions Taken  : Cologuard overdue     Additional Notes:    Ask patient where last labs were done.       Care Management, Digital Medicine, and/or Education Referrals      Eligible for Digital medicine.

## 2024-07-25 NOTE — LETTER
Dear Frankie [facility] is committed to your overall health. Periodically we review the health information in your chart to make sure you are up to date on all of your recommended tests and/or procedures.       Our review of your chart shows that you may be due for       VBHM Score: 3     Colon Cancer Screening  Osteoporosis Screening  Mammogram    Shingles/Zoster Vaccine  RSV Vaccine            If you have had any of the above done at another facility, please let us know and we will request a copy of the report to update your [facility] record.     At your convenience I would like to speak to you to help get these items scheduled (if needed) and also see if there is anything else we can do to help you. Please send me a message via your patient portal or give me a call at [phone].  I am looking forward to speaking with you soon.     Sincerely,      YUMIKO Sultana Care Coordinator  Shy Valentin MD and your [facility] Team

## 2024-08-15 DIAGNOSIS — F41.9 ANXIETY: ICD-10-CM

## 2024-08-15 DIAGNOSIS — M51.36 DDD (DEGENERATIVE DISC DISEASE), LUMBAR: ICD-10-CM

## 2024-08-16 RX ORDER — TRAMADOL HYDROCHLORIDE 50 MG/1
50 TABLET ORAL EVERY 6 HOURS PRN
Qty: 28 TABLET | Refills: 0 | OUTPATIENT
Start: 2024-08-16

## 2024-08-16 RX ORDER — ALPRAZOLAM 1 MG/1
TABLET ORAL
Qty: 30 TABLET | Refills: 3 | OUTPATIENT
Start: 2024-08-16

## 2024-08-26 PROBLEM — Z00.00 WELLNESS EXAMINATION: Status: ACTIVE | Noted: 2024-08-26

## 2024-08-26 NOTE — PROGRESS NOTES
Date: 09/05/2024  Patient ID: [id number]   Chief Complaint: Medicare AWV    HPI:   Mark Davila is a 74 y.o. female here today for a Medicare Wellness.     Opioid Screening: Patient medication list reviewed, patient is not taking prescription opioids. Patient is not using additional opioids than prescribed. Patient is at low risk of substance abuse based on this opioid use history.     Otherwise, patient reports to be doing well. Recent labs showed potassium 3.1, calcium 10.8; A1c 5.3, hep C negative, WNL vitamin-D and CBC, UA.  passed 3mos ago, and patient has good/bad days but is getting better    Diet: more protein and vegetables, broccoli, couliflower, banana  Activity level: will start exercising [facility] recreation center.   Cognition: answering questions appropriately and following conversation without issues. No sign of cognitive decline during this exam  MMG: pending  Pap: pending  DEXA: pending  CRC: pending      Patient Active Problem List   Diagnosis    DDD (degenerative disc disease), lumbar    Cervical radiculitis    Localized swelling of right lower extremity    Mild intermittent asthma, uncomplicated    Anxiety    HTN (hypertension)    Asthma    Diverticulosis    Wellness examination    Hypokalemia     Outpatient Medications Marked as Taking for the 9/5/24 encounter (Office Visit) with Shy Valentin MD   Medication Sig Dispense Refill    alclometasone (ACLOVATE) 0.05 % cream Apply topically 2 (two) times daily.      ALPRAZolam (XANAX) 1 MG tablet Take 1/2 tablet qhs prn for insomnia 30 tablet 3    coenzyme Q10 100 mg capsule       doxycycline (MONODOX) 50 MG Cap Take 100 mg by mouth nightly.      levocetirizine (XYZAL) 5 MG tablet Xyzal 5 mg tablet   Take 1 tablet every day by oral route as needed.      prednisoLONE (PRELONE) 15 mg/5 mL syrup TAKE 5 10 ML BY MOUTH 3 TIMES A DAY AS NEEDED FOR MUCOCITIS/DYSPNEA 480 mL 1    [DISCONTINUED] albuterol (PROVENTIL/VENTOLIN HFA) 90  mcg/actuation inhaler Inhale 1 puff into the lungs every 6 (six) hours as needed for Wheezing. Rescue 6.7 g 11    [DISCONTINUED] budesonide (PULMICORT) 0.5 mg/2 mL nebulizer solution Take 2 mLs (0.5 mg total) by nebulization 4 (four) times daily as needed (shortness of breath). 2 mL 11    [DISCONTINUED] budesonide-formoterol 80-4.5 mcg (SYMBICORT) 80-4.5 mcg/actuation HFAA Inhale 2 puffs into the lungs 2 (two) times a day. 10.2 g 11    [DISCONTINUED] montelukast (SINGULAIR) 10 mg tablet Take 1 tablet (10 mg total) by mouth once daily. 90 tablet 3    [DISCONTINUED] traMADoL (ULTRAM) 50 mg tablet Take 1 tablet (50 mg total) by mouth every 6 (six) hours as needed for Pain. 28 tablet 0    [DISCONTINUED] valsartan-hydrochlorothiazide (DIOVAN-HCT) 320-25 mg per tablet Take 1 tablet by mouth once daily. 90 tablet 3     Past Medical History:   Diagnosis Date    Degenerative joint disease 06/06/2023    HTN (hypertension)     Insomnia     Mild intermittent asthma, uncomplicated     Mild intermittent asthma, uncomplicated     Phlebitis     right leg     Past Surgical History:   Procedure Laterality Date    APPENDECTOMY      BUNIONECTOMY      CHOLECYSTECTOMY      SINUS SURGERY      x 3 with implant    TONSILLECTOMY       Review of patient's allergies indicates:  No Known Allergies  No family history on file.   Social History     Socioeconomic History    Marital status:    Tobacco Use    Smoking status: Never    Smokeless tobacco: Never   Substance and Sexual Activity    Alcohol use: Yes     Comment: 4 times a week 2 drinks    Drug use: Never    Sexual activity: Yes     Social Determinants of Health     Financial Resource Strain: Low Risk  (9/5/2024)    Overall Financial Resource Strain (CARDIA)     Difficulty of Paying Living Expenses: Not hard at all   Food Insecurity: No Food Insecurity (9/5/2024)    Hunger Vital Sign     Worried About Running Out of Food in the Last Year: Never true     Ran Out of Food in the Last  Year: Never true   Transportation Needs: No Transportation Needs (9/5/2024)    TRANSPORTATION NEEDS     Transportation : No   Physical Activity: Inactive (9/5/2024)    Exercise Vital Sign     Days of Exercise per Week: 0 days     Minutes of Exercise per Session: 0 min   Stress: No Stress Concern Present (9/5/2024)    [employer] - Occupational Stress Questionnaire     Feeling of Stress : Not at all   Housing Stability: Low Risk  (9/5/2024)    Housing Stability Vital Sign     Unable to Pay for Housing in the Last Year: No     Homeless in the Last Year: No     Patient Care Team:  Shy Valentin MD as PCP - General (Family Medicine)  Bernadette Peñaloza LPN as Care Coordinator   Subjective:   Review of Systems   Constitutional:  Negative for fever and weight loss.   HENT:  Negative for congestion, hearing loss and sore throat.    Eyes:  Negative for blurred vision and double vision.   Respiratory:  Negative for cough and shortness of breath.    Cardiovascular:  Negative for chest pain and palpitations.   Gastrointestinal:  Negative for abdominal pain and diarrhea.   Genitourinary:  Negative for dysuria, frequency, hematuria and urgency.   Musculoskeletal:  Negative for falls.   Skin:  Negative for rash.   Psychiatric/Behavioral:  Negative for depression and memory loss. The patient is not nervous/anxious and does not have insomnia.      See HPI for details  All Other ROS: Negative except as stated in HPI  Patient Reported Health Risk Assessment  [age] 70-79  Are you male or female?: Female  During the past four weeks, how much have you been bothered by emotional problems such as feeling anxious, depressed, irritable, sad, or downhearted and blue?: Not at all  During the past five weeks, has your physical and/or emotional health limited your social activities with family, friends, neighbors, or groups?: Not at all  During the past four weeks, how much bodily pain have you  generally had?: Mild pain  During the past four weeks, was someone available to help if you needed and wanted help?: Yes, as much as I wanted  During the past four weeks, what was the hardest physical activity you could do for at least two minutes?: Moderate  Can you get to places out of walking distance without help?  (For example, can you travel alone on buses or taxis, or drive your own car?): Yes  Can you go shopping for groceries or clothes without someone's help?: Yes  Can you prepare your own meals?: Yes  Can you do your own housework without help?: Yes  Because of any health problems, do you need the help of another person with your personal care needs such as eating, bathing, dressing, or getting around the house?: No  Can you handle your own money without help?: Yes  During the past four weeks, how would you rate your health in general?: Good  How have things been going for you during the past four weeks?: Pretty well  Are you having difficulties driving your car?: No  Do you always fasten your seat belt when you are in a car?: Yes, usually  How often in the past four weeks have you been bothered by falling or dizzy when standing up?: Never  How often in the past four weeks have you been bothered by sexual problems?: Never  How often in the past four weeks have you been bothered by trouble eating well?: Never  How often in the past four weeks have you been bothered by teeth or denture problems?: Never  How often in the past four weeks have you been bothered with problems using the telephone?: Never  How often in the past four weeks have you been bothered by tiredness or fatigue?: Never  Have you fallen two or more times in the past year?: No  Are you afraid of falling?: No  Are you a smoker?: No  During the past four weeks, how many drinks of wine, beer, or other alcoholic beverages did you have?: 2-5 drinks per weeks  Do you exercise for about 20 minutes three or more days a week?: No, I usually do not  "exercise this much  Have you been given any information to help you with hazards in your house that might hurt you?: Yes  Have you been given any information to help you with keeping track of your medications?: Yes  How often do you have trouble taking medicines the way you've been told to take them?: I always take them as prescribed  How confident are you that you can control and manage most of your health problems?: Very confident  What is your race? (Check all that apply.):   Objective:   /68   Pulse 69   Ht 5' 2" (1.575 m)   Wt 67.1 kg (148 lb)   SpO2 99%   BMI 27.07 kg/m²   Physical Exam  Vitals reviewed.   Constitutional:       Appearance: Normal appearance.   HENT:      Head: Normocephalic and atraumatic.      Right Ear: Tympanic membrane, ear canal and external ear normal.      Left Ear: Tympanic membrane, ear canal and external ear normal.      Nose: Nose normal. No congestion or rhinorrhea.      Mouth/Throat:      Mouth: Mucous membranes are moist.      Pharynx: Oropharynx is clear.   Eyes:      General: No scleral icterus.     Extraocular Movements: Extraocular movements intact.      Conjunctiva/sclera: Conjunctivae normal.   Cardiovascular:      Rate and Rhythm: Normal rate and regular rhythm.      Pulses: Normal pulses.      Heart sounds: Normal heart sounds.   Pulmonary:      Effort: Pulmonary effort is normal.      Breath sounds: Normal breath sounds.   Abdominal:      General: Abdomen is flat. Bowel sounds are normal.      Palpations: Abdomen is soft.      Tenderness: There is no abdominal tenderness.   Musculoskeletal:         General: No swelling or deformity. Normal range of motion.      Cervical back: Normal range of motion and neck supple.   Skin:     General: Skin is warm and dry.   Neurological:      Mental Status: She is alert and oriented to person, place, and time.   Psychiatric:         Mood and Affect: Mood normal.         Behavior: Behavior normal.         Thought " Content: Thought content normal.         Judgment: Judgment normal.            No data to display                  9/5/2024    10:45 AM 2/28/2024     1:00 PM 11/27/2023     1:15 PM 8/24/2023     2:30 PM 6/6/2023     1:30 PM 2/23/2023    10:00 AM 2/9/2023     2:15 PM   Fall Risk Assessment - Outpatient   Mobility Status Ambulatory Ambulatory Ambulatory Ambulatory Ambulatory Ambulatory Ambulatory   Number of falls 0 0 0 0 0 0 1 with injury   Identified as fall risk False False False False False False True           Depression Screening  Over the past two weeks, has the patient felt down, depressed, or hopeless?: No  Over the past two weeks, has the patient felt little interest or pleasure in doing things?: No  Functional Ability/Safety Screening  Was the patient's timed Up & Go test unsteady or longer than 30 seconds?: No  Does the patient need help with phone, transportation, shopping, preparing meals, housework, laundry, meds, or managing money?: No  Does the patient's home have rugs in the hallway, lack grab bars in the bathroom, lack handrails on the stairs or have poor lighting?: No  Have you noticed any hearing difficulties?: No  Cognitive Function (Assessed through direct observation with due consideration of information obtained by way of patient reports and/or concerns raised by family, friends, caretakers, or others)    Does the patient repeat questions/statements in the same day?: No  Does the patient have trouble remembering the date, year, and time?: No  Does the patient have difficulty managing finances?: No  Does the patient have a decreased sense of direction?: No  Assessment:       ICD-10-CM ICD-9-CM   1. Wellness examination  Z00.00 V70.0   2. Hypokalemia  E87.6 276.8   3. Asthma, unspecified asthma severity, unspecified whether complicated, unspecified whether persistent  J45.909 493.90   4. DDD (degenerative disc disease), lumbar  M51.36 722.52   5. Primary hypertension  I10 401.9      Plan:   1.  Wellness examination  Assessment & Plan:  Provided Mark Davila with a 5-10 year written screening schedule and personal prevention plan. Recommendations were developed using the USPSTF age appropriate recommendations. Education, counseling, and referrals were provided as needed. After Visit Summary printed and given to patient which includes a list of additional screenings\tests needed.         2. Hypokalemia  Assessment & Plan:  Kcl 10mEq qd x 14d with Mg and recheck Mg and CMP    Orders:  -     potassium chloride (MICRO-K) 10 MEQ CpSR; Take 1 capsule (10 mEq total) by mouth once daily.  Dispense: 14 capsule; Refill: 0  -     Comprehensive Metabolic Panel; Future; Expected date: 09/05/2024  -     Magnesium; Future; Expected date: 09/05/2024    3. Asthma, unspecified asthma severity, unspecified whether complicated, unspecified whether persistent  Assessment & Plan:  Stable  Continue current treatment with Symbicort and Albuterol prn      Orders:  -     albuterol (PROVENTIL/VENTOLIN HFA) 90 mcg/actuation inhaler; Inhale 1 puff into the lungs every 6 (six) hours as needed for Wheezing. Rescue  Dispense: 6.7 g; Refill: 11  -     budesonide (PULMICORT) 0.5 mg/2 mL nebulizer solution; Take 2 mLs (0.5 mg total) by nebulization 4 (four) times daily as needed (shortness of breath).  Dispense: 2 mL; Refill: 11  -     budesonide-formoterol 80-4.5 mcg (SYMBICORT) 80-4.5 mcg/actuation HFAA; Inhale 2 puffs into the lungs 2 (two) times a day.  Dispense: 10.2 g; Refill: 11  -     montelukast (SINGULAIR) 10 mg tablet; Take 1 tablet (10 mg total) by mouth once daily.  Dispense: 90 tablet; Refill: 3    4. DDD (degenerative disc disease), lumbar  Assessment & Plan:  Continue Stem Wave tx  Tramadol refilled for breakthrough pain    Orders:  -     traMADoL (ULTRAM) 50 mg tablet; Take 1 tablet (50 mg total) by mouth every 6 (six) hours as needed for Pain.  Dispense: 28 tablet; Refill: 0    5. Primary hypertension  Assessment &  Plan:  Continue current medication regimen: Diovan 320-25mg qd  Blood pressure at goal <140/90 (<130/80 if otherwise noted)  Recommend DASH diet  Avoid tobacco use  Record BP at home daily and bring log to next office visit, assure that home cuff is calibrated at minimum every 12 months      Orders:  -     valsartan-hydrochlorothiazide (DIOVAN-HCT) 320-25 mg per tablet; Take 1 tablet by mouth once daily.  Dispense: 90 tablet; Refill: 3         Medicare Annual Wellness and Personalized Prevention Plan:   Fall Risk + Home Safety + Hearing Impairment + Depression Screen + Opioid and Substance Abuse Screening + Cognitive Impairment Screen + Health Risk Assessment all reviewed.     Health Maintenance Topics with due status: Not Due       Topic Last Completion Date    TETANUS VACCINE 05/08/2020    Lipid Panel 02/23/2024      The patient's Health Maintenance was reviewed and the following appears to be due at this time:   Health Maintenance Due   Topic Date Due    DEXA Scan  Never done    RSV Vaccine (Age 60+ and Pregnant patients) (1 - 1-dose 60+ series) Never done    Shingles Vaccine (2 of 3) 06/29/2015    Mammogram  01/16/2020    Colorectal Cancer Screening  04/10/2023    Influenza Vaccine (1) 09/01/2024    COVID-19 Vaccine (3 - 2023-24 season) 09/01/2024       Advance Care Planning     Date: 08/26/2024    Living Will  During this visit, I engaged the patient and family  in the voluntary advance care planning process.  The patient and I reviewed the role for advance directives and their purpose in directing future healthcare if the patient's unable to speak for him/herself.  At this point in time, the patient does have full decision-making capacity.  We discussed different extreme health states that she could experience, and reviewed what kind of medical care she would want in those situations. Has one pending soon         A separate E/M code has been provided to evaluate additional concerns addressed during the  dedicated Wellness Exam.      Follow up in about 1 year (around 9/5/2025) for Pls print lab order for pt, Medicare Wellness; labs in 2wks. In addition to their scheduled follow up, the patient has also been instructed to follow up on as needed basis.

## 2024-08-26 NOTE — ASSESSMENT & PLAN NOTE
Provided Mark Davila with a 5-10 year written screening schedule and personal prevention plan. Recommendations were developed using the USPSTF age appropriate recommendations. Education, counseling, and referrals were provided as needed. After Visit Summary printed and given to patient which includes a list of additional screenings\tests needed.

## 2024-09-05 ENCOUNTER — OFFICE VISIT (OUTPATIENT)
Dept: PRIMARY CARE CLINIC | Facility: CLINIC | Age: 74
End: 2024-09-05
Payer: MEDICARE

## 2024-09-05 ENCOUNTER — DOCUMENTATION ONLY (OUTPATIENT)
Dept: PRIMARY CARE CLINIC | Facility: CLINIC | Age: 74
End: 2024-09-05

## 2024-09-05 VITALS
WEIGHT: 148 LBS | BODY MASS INDEX: 27.23 KG/M2 | HEIGHT: 62 IN | DIASTOLIC BLOOD PRESSURE: 68 MMHG | OXYGEN SATURATION: 99 % | HEART RATE: 69 BPM | SYSTOLIC BLOOD PRESSURE: 111 MMHG

## 2024-09-05 DIAGNOSIS — Z00.00 WELLNESS EXAMINATION: Primary | ICD-10-CM

## 2024-09-05 DIAGNOSIS — E87.6 HYPOKALEMIA: ICD-10-CM

## 2024-09-05 DIAGNOSIS — M51.36 DDD (DEGENERATIVE DISC DISEASE), LUMBAR: ICD-10-CM

## 2024-09-05 DIAGNOSIS — J45.909 ASTHMA, UNSPECIFIED ASTHMA SEVERITY, UNSPECIFIED WHETHER COMPLICATED, UNSPECIFIED WHETHER PERSISTENT: ICD-10-CM

## 2024-09-05 DIAGNOSIS — I10 PRIMARY HYPERTENSION: ICD-10-CM

## 2024-09-05 LAB — HEMOGLOBIN: 5

## 2024-09-05 RX ORDER — MONTELUKAST SODIUM 10 MG/1
10 TABLET ORAL DAILY
Qty: 90 TABLET | Refills: 3 | Status: SHIPPED | OUTPATIENT
Start: 2024-09-05

## 2024-09-05 RX ORDER — ALBUTEROL SULFATE 90 UG/1
1 INHALANT RESPIRATORY (INHALATION) EVERY 6 HOURS PRN
Qty: 6.7 G | Refills: 11 | Status: SHIPPED | OUTPATIENT
Start: 2024-09-05

## 2024-09-05 RX ORDER — VALSARTAN AND HYDROCHLOROTHIAZIDE 320; 25 MG/1; MG/1
1 TABLET, FILM COATED ORAL DAILY
Qty: 90 TABLET | Refills: 3 | Status: SHIPPED | OUTPATIENT
Start: 2024-09-05

## 2024-09-05 RX ORDER — POTASSIUM CHLORIDE 750 MG/1
10 CAPSULE, EXTENDED RELEASE ORAL DAILY
Qty: 14 CAPSULE | Refills: 0 | Status: SHIPPED | OUTPATIENT
Start: 2024-09-05

## 2024-09-05 RX ORDER — BUDESONIDE AND FORMOTEROL FUMARATE DIHYDRATE 80; 4.5 UG/1; UG/1
2 AEROSOL RESPIRATORY (INHALATION) 2 TIMES DAILY
Qty: 10.2 G | Refills: 11 | Status: SHIPPED | OUTPATIENT
Start: 2024-09-05

## 2024-09-05 RX ORDER — BUDESONIDE 0.5 MG/2ML
0.5 INHALANT ORAL 4 TIMES DAILY PRN
Qty: 2 ML | Refills: 11 | Status: SHIPPED | OUTPATIENT
Start: 2024-09-05

## 2024-09-05 RX ORDER — TRAMADOL HYDROCHLORIDE 50 MG/1
50 TABLET ORAL EVERY 6 HOURS PRN
Qty: 28 TABLET | Refills: 0 | Status: SHIPPED | OUTPATIENT
Start: 2024-09-05

## 2024-09-05 NOTE — ASSESSMENT & PLAN NOTE
Continue current medication regimen: Diovan 320-25mg qd  Blood pressure at goal <140/90 (<130/80 if otherwise noted)  Recommend DASH diet  Avoid tobacco use  Record BP at home daily and bring log to next office visit, assure that home cuff is calibrated at minimum every 12 months

## 2024-09-19 ENCOUNTER — PATIENT OUTREACH (OUTPATIENT)
Facility: CLINIC | Age: 74
End: 2024-09-19
Payer: MEDICARE

## 2024-09-19 NOTE — PROGRESS NOTES
Health Maintenance Topic(s) Outreach Outcomes & Actions Taken:  Chart review, I will notify the patient.    Breast Cancer Screening - Outreach Outcomes & Actions Taken  :      Colorectal Cancer Screening - Outreach Outcomes & Actions Taken  : Cologuard Overdue    Osteoporosis Screening - Outreach Outcomes & Actions Taken  :             Additional Notes:           Care Management, Digital Medicine, and/or Education Referrals

## 2024-09-26 ENCOUNTER — TELEPHONE (OUTPATIENT)
Dept: PRIMARY CARE CLINIC | Facility: CLINIC | Age: 74
End: 2024-09-26
Payer: MEDICARE

## 2024-09-26 NOTE — TELEPHONE ENCOUNTER
----- Message from Erika Cummings sent at 9/26/2024 10:51 AM CDT -----  Regarding: call back  .Who Called: Mark Davila    Caller is requesting assistance/information from provider's office.    Symptoms (please be specific):    How long has patient had these symptoms:    List of preferred pharmacies on file (remove unneeded): [unfilled]  If different, enter pharmacy into here including location and phone number:       Preferred Method of Contact: Phone Call  Patient's Preferred Phone Number on File: [phone]   Best Call Back Number, if different:  Additional Information: pt requesting a copy of her orders for labs be emailed to her @ [email or web address]

## 2024-10-01 ENCOUNTER — PATIENT OUTREACH (OUTPATIENT)
Facility: CLINIC | Age: 74
End: 2024-10-01
Payer: MEDICARE

## 2025-01-27 ENCOUNTER — PATIENT OUTREACH (OUTPATIENT)
Facility: CLINIC | Age: 75
End: 2025-01-27
Payer: MEDICARE

## 2025-05-08 ENCOUNTER — PATIENT OUTREACH (OUTPATIENT)
Facility: CLINIC | Age: 75
End: 2025-05-08
Payer: MEDICARE

## 2025-05-08 NOTE — PROGRESS NOTES
Health Maintenance Topic(s) Outreach Outcomes & Actions Taken:    Breast Cancer Screening - Outreach Outcomes & Actions Taken  : I attempted to contact patient. No answer. Left message.        Additional Notes:

## 2025-05-29 DIAGNOSIS — J45.909 ASTHMA, UNSPECIFIED ASTHMA SEVERITY, UNSPECIFIED WHETHER COMPLICATED, UNSPECIFIED WHETHER PERSISTENT: ICD-10-CM

## 2025-05-29 RX ORDER — BUDESONIDE AND FORMOTEROL FUMARATE DIHYDRATE 80; 4.5 UG/1; UG/1
2 AEROSOL RESPIRATORY (INHALATION) 2 TIMES DAILY
Qty: 10.2 G | Refills: 11 | Status: SHIPPED | OUTPATIENT
Start: 2025-05-29

## 2025-08-22 ENCOUNTER — TELEPHONE (OUTPATIENT)
Dept: PRIMARY CARE CLINIC | Facility: CLINIC | Age: 75
End: 2025-08-22
Payer: MEDICARE

## 2025-08-22 DIAGNOSIS — I10 PRIMARY HYPERTENSION: ICD-10-CM

## 2025-08-22 DIAGNOSIS — J45.909 ASTHMA, UNSPECIFIED ASTHMA SEVERITY, UNSPECIFIED WHETHER COMPLICATED, UNSPECIFIED WHETHER PERSISTENT: ICD-10-CM

## 2025-08-22 RX ORDER — VALSARTAN AND HYDROCHLOROTHIAZIDE 320; 25 MG/1; MG/1
1 TABLET, FILM COATED ORAL DAILY
Qty: 90 TABLET | Refills: 3 | Status: SHIPPED | OUTPATIENT
Start: 2025-08-22

## 2025-08-22 RX ORDER — MONTELUKAST SODIUM 10 MG/1
10 TABLET ORAL DAILY
Qty: 90 TABLET | Refills: 3 | Status: SHIPPED | OUTPATIENT
Start: 2025-08-22